# Patient Record
Sex: FEMALE | Race: WHITE | URBAN - METROPOLITAN AREA
[De-identification: names, ages, dates, MRNs, and addresses within clinical notes are randomized per-mention and may not be internally consistent; named-entity substitution may affect disease eponyms.]

---

## 2017-09-13 ENCOUNTER — TRANSFERRED RECORDS (OUTPATIENT)
Dept: HEALTH INFORMATION MANAGEMENT | Facility: CLINIC | Age: 52
End: 2017-09-13

## 2018-04-12 ENCOUNTER — OFFICE VISIT (OUTPATIENT)
Dept: SURGERY | Facility: CLINIC | Age: 53
End: 2018-04-12
Payer: COMMERCIAL

## 2018-04-12 VITALS
SYSTOLIC BLOOD PRESSURE: 100 MMHG | BODY MASS INDEX: 41.64 KG/M2 | HEIGHT: 63 IN | DIASTOLIC BLOOD PRESSURE: 60 MMHG | HEART RATE: 83 BPM | WEIGHT: 235 LBS

## 2018-04-12 DIAGNOSIS — K43.9 VENTRAL HERNIA WITHOUT OBSTRUCTION OR GANGRENE: Primary | ICD-10-CM

## 2018-04-12 PROCEDURE — 99204 OFFICE O/P NEW MOD 45 MIN: CPT | Performed by: SURGERY

## 2018-04-12 NOTE — LETTER
2018    RE: Penny Hood, : 1965             Assessment/plan: Pleasant 52-year-old female in remission from ovarian cancer surgically managed in .  Patient now has what I think is a sizable ventral hernia.  Her body habitus makes physical exam somewhat difficult and I have told her that I would try to obtain one of her previous CT scans.  It is my understanding that she had a CT scan done as recently as 2017.  If I can find this, I will have another CT scan performed.  I expect that if hernia is as big as I think it is, she would benefit from an open ventral hernia repair with component separation.  This would be done as an inpatient, likely staying 1-2 nights.  Surgical co-morbities include obesity, extensive previous abdominal surgery.           Jamie Lopez M.D.  [facility]  [phone]

## 2018-04-12 NOTE — MR AVS SNAPSHOT
"              After Visit Summary   2018    Penny Hood    MRN: [id number]           Patient Information     Date Of Birth          1965        Visit Information        Provider Department      2018 1:00 PM Toni Lopez MD [facility] General Surgery       Follow-ups after your visit        Who to contact     If you have questions or need follow up information about today's clinic visit or your schedule please contact [facility] directly at [phone].  Normal or non-critical lab and imaging results will be communicated to you by [employer], letter or phone within 4 business days after the clinic has received the results. If you do not hear from us within 7 days, please contact the clinic through [employer] or phone. If you have a critical or abnormal lab result, we will notify you by phone as soon as possible.  Submit refill requests through [employer] or call your pharmacy and they will forward the refill request to us. Please allow 3 business days for your refill to be completed.          Additional Information About Your Visit        MyChart Information     [employer] lets you send messages to your doctor, view your test results, renew your prescriptions, schedule appointments and more. To sign up, go to [email or web address] . Click on \"Log in\" on the left side of the screen, which will take you to the Welcome page. Then click on \"Sign up Now\" on the right side of the page.     You will be asked to enter the access code listed below, as well as some personal information. Please follow the directions to create your username and password.     Your access code is: [email or web address]  Expires: 2018  1:30 PM     Your access code will  in 90 days. If you need help or a new code, please call your [place] clinic or [phone].        Care EveryWhere ID     This is your Care EveryWhere ID. This could be used by other " "organizations to access your [place] medical records  [id number]        Your Vitals Were     Pulse Height BMI (Body Mass Index)             83 5' 3\" (1.6 m) 41.63 kg/m2          Blood Pressure from Last 3 Encounters:   04/12/18 100/60    Weight from Last 3 Encounters:   04/12/18 235 lb (106.6 kg)              Today, you had the following     No orders found for display       Primary Care Provider Office Phone # Fax #    Jodie Vasquez [phone]       [facility] [address] [facility] [address]  [facility] [address]        Equal Access to Services     [facility]: Hadii rafael durant hadasho Soomaali, waaxda luqadaha, qaybta kaalmada adeegyada, boom overton . So [facility] [phone].    ATENCIÓN: Si habla español, tiene a sethi disposición servicios gratuitos de asistencia lingüística. [facility] [phone].    We comply with applicable federal civil rights laws and [place] laws. We do not discriminate on the basis of race, color, national origin, age, disability, sex, sexual orientation, or gender identity.            Thank you!     Thank you for choosing [facility]  for your care. Our goal is always to provide you with excellent care. Hearing back from our patients is one way we can continue to improve our services. Please take a few minutes to complete the written survey that you may receive in the mail after your visit with us. Thank you!             Your Updated Medication List - Protect others around you: Learn how to safely use, store and throw away your medicines at www.disposemymeds.org.          This list is accurate as of 4/12/18  1:30 PM.  Always use your most recent med list.                   Brand Name Dispense Instructions for use Diagnosis    BUPROPION HCL PO      Take 450 mg by mouth daily        LETROZOLE PO             "

## 2018-04-13 PROBLEM — K43.9 VENTRAL HERNIA WITHOUT OBSTRUCTION OR GANGRENE: Status: ACTIVE | Noted: 2018-04-13

## 2018-04-13 NOTE — PROGRESS NOTES
"[facility]         Toni Lopez MD    Penny Hood MRN# [id number]   YOB: 1965 Age: 52 year old     PCP:  Jodie Vasquez [phone]    Chief Complaint: Ventral hernia    Pt was seen in consultation from Jodie Vasquez.    History of Present Illness:  Penny Hood is a 52 year old female who presented with recent progression of a ventral hernia.  Patient has a history of hysterectomy performed in 2014.  It was noted at that time that the patient had an ovarian malignancy and she subsequently underwent open staging with lymph node retrieval and omentectomy.  Did undergo chemo and has been in remission.  She was noted to have some fullness around the infraumbilical incision.  This has been slowly progressive.  She is overweight and her physical exam is somewhat challenging.  She does feel that the area of fullness has increased in size and she is here to discuss her hernia.    PMH:  Penny Hood  has a past medical history of Ovarian cancer (H).  PSH:  Penny Hood  has a past surgical history that includes Abdomen surgery (05/14/2014) and Abdomen surgery (05/20/2014).    Home medications and allergies reviewed.    Social History:  Penny Hood  reports that she quit smoking about 11 years ago. She has never used smokeless tobacco. She reports that she does not drink alcohol or use illicit drugs.  Family History:  Penny Hood family history includes Heart Failure in her father; Pancreatic Cancer in her maternal grandmother; Prostate Cancer in her father.    ROS:  The 10 point Review of Systems is negative other than noted in the HPI.  No fevers or chills.  Some difficulty with having bowel movements.    Physical Exam:  Blood pressure 100/60, pulse 83, height 5' 3\" (1.6 m), weight 235 lb (106.6 kg).  235 lbs 0 oz  Healthy overweight female in no distress.  Patient has a pleasant affect and communicates well.   Pupils " equal round and reactive to light.   No cervical lymphadenopathy or thyromegaly.   Lung fields clear, breathing comfortably.   Heart normal sinus rhythm.  No murmurs rubs or gallops.  Abdomen soft, nontender, nondistended.  Well-healed infraumbilical midline incision.  Obvious fullness extending to the left of the incision and to the right, spans a distance approximately 10-12 cm.  Fascial edges not able to be appreciated.  Minimally tender.  No overlying skin changes.  Skin warm, dry.  No obvious rashes or lesions.    All new lab and imaging data was reviewed.      Assessment/plan: Pleasant 52-year-old female in remission from ovarian cancer surgically managed in 2014.  Patient now has what I think is a sizable ventral hernia.  Her body habitus makes physical exam somewhat difficult and I have told her that I would try to obtain one of her previous CT scans.  It is my understanding that she had a CT scan done as recently as September 2017.  If I can find this, I will have another CT scan performed.  I expect that if hernia is as big as I think it is, she would benefit from an open ventral hernia repair with component separation.  This would be done as an inpatient, likely staying 1-2 nights.  Surgical co-morbities include obesity, extensive previous abdominal surgery.    Jamie Lopez M.D.  [facility]  [phone]    Please route or send letter to:  Primary Care Provider (PCP) and Referring Provider

## 2018-04-26 ENCOUNTER — OFFICE VISIT (OUTPATIENT)
Dept: SURGERY | Facility: CLINIC | Age: 53
End: 2018-04-26
Payer: COMMERCIAL

## 2018-04-26 DIAGNOSIS — K43.9 VENTRAL HERNIA WITHOUT OBSTRUCTION OR GANGRENE: Primary | ICD-10-CM

## 2018-04-26 PROCEDURE — 99213 OFFICE O/P EST LOW 20 MIN: CPT | Performed by: SURGERY

## 2018-04-26 NOTE — MR AVS SNAPSHOT
"              After Visit Summary   2018    Penny Hood    MRN: [id number]           Patient Information     Date Of Birth          1965        Visit Information        Provider Department      2018 1:15 PM Toni Lopez MD [facility] General Surgery       Follow-ups after your visit        Who to contact     If you have questions or need follow up information about today's clinic visit or your schedule please contact [facility] directly at [phone].  Normal or non-critical lab and imaging results will be communicated to you by [employer], letter or phone within 4 business days after the clinic has received the results. If you do not hear from us within 7 days, please contact the clinic through [employer] or phone. If you have a critical or abnormal lab result, we will notify you by phone as soon as possible.  Submit refill requests through [email or web address] or call your pharmacy and they will forward the refill request to us. Please allow 3 business days for your refill to be completed.          Additional Information About Your Visit        MyChart Information     [email or web address] lets you send messages to your doctor, view your test results, renew your prescriptions, schedule appointments and more. To sign up, go to [email or web address] . Click on \"Log in\" on the left side of the screen, which will take you to the Welcome page. Then click on \"Sign up Now\" on the right side of the page.     You will be asked to enter the access code listed below, as well as some personal information. Please follow the directions to create your username and password.     Your access code is: [email or web address]  Expires: 2018  1:30 PM     Your access code will  in 90 days. If you need help or a new code, please call your [place] clinic or [phone].        Care EveryWhere ID     This is your Care EveryWhere ID. This could be used by other " organizations to access your [place] medical records  [id number]         Blood Pressure from Last 3 Encounters:   04/12/18 100/60    Weight from Last 3 Encounters:   04/12/18 235 lb (106.6 kg)              Today, you had the following     No orders found for display       Primary Care Provider Office Phone # Fax #    Jodie Vasquez [phone]       [facility] [address] [facility] [address]  [facility] [address]        Equal Access to Services     CANDIDO BENAVIDEZ : Hadii aad ku hadasho Soomaali, waaxda luqadaha, qaybta kaalmada adeegyada, waxay idiin hayaan adeeg kharash la'aan . So [facility] [phone].    ATENCIÓN: Si habla español, tiene a sethi disposición servicios gratuitos de asistencia lingüística. [facility] [phone].    We comply with applicable federal civil rights laws and [place] laws. We do not discriminate on the basis of race, color, national origin, age, disability, sex, sexual orientation, or gender identity.            Thank you!     Thank you for choosing [facility]  for your care. Our goal is always to provide you with excellent care. Hearing back from our patients is one way we can continue to improve our services. Please take a few minutes to complete the written survey that you may receive in the mail after your visit with us. Thank you!             Your Updated Medication List - Protect others around you: Learn how to safely use, store and throw away your medicines at www.disposemymeds.org.          This list is accurate as of 4/26/18  1:21 PM.  Always use your most recent med list.                   Brand Name Dispense Instructions for use Diagnosis    BUPROPION HCL PO      Take 450 mg by mouth daily        LETROZOLE PO

## 2018-04-26 NOTE — LETTER
2018    Re: Penny Hood, : 1965    Surgery Note     Penny Hood presents today for further discussion of her ventral hernia.  I was able to review her most recent CT scan.  She has a large infraumbilical ventral hernia containing nonobstructed small bowel loops.  This has a greatest diameter of approximately 6-8 cm.  Think this would best be managed with an open ventral hernia repair.  This would be a retrorectus repair with mesh.  The risks of the surgery were explained to the patient, which include recurrence, wound infection, bowel injury, and mesh infection requiring removal.  She understood these risks and was interested in scheduling surgery.     Jamie Lopez M.D.  [facility]  [phone]

## 2018-04-26 NOTE — PROGRESS NOTES
Surgery Note    Penny Hood presents today for further discussion of her ventral hernia.  I was able to review her most recent CT scan.  She has a large infraumbilical ventral hernia containing nonobstructed small bowel loops.  This has a greatest diameter of approximately 6-8 cm.  Think this would best be managed with an open ventral hernia repair.  This would be a retrorectus repair with mesh.  The risks of the surgery were explained to the patient, which include recurrence, wound infection, bowel injury, and mesh infection requiring removal.  She understood these risks and was interested in scheduling surgery.    Jamie Lopez M.D.  [facility]  [phone]    Please route or send letter to:  Primary Care Provider (PCP) and Referring Provider

## 2018-04-27 ENCOUNTER — TELEPHONE (OUTPATIENT)
Dept: SURGERY | Facility: CLINIC | Age: 53
End: 2018-04-27

## 2018-04-27 NOTE — TELEPHONE ENCOUNTER
Type of surgery: Open ventral hernia repair  Location of surgery: [facility]  Date and time of surgery: 5/23/18 at 8:20am  Surgeon: Dr. Toni Lopez  Pre-Op Appt Date: Patient to schedule  Post-Op Appt Date: Patient to schedule   Packet sent out: Yes  Pre-cert/Authorization completed:  Not Applicable  Date: 4/26/18 5/7/18: Surgery rescheduled to 6/15/18 at 9:30am

## 2018-06-12 RX ORDER — MAGNESIUM CARB/ALUMINUM HYDROX 105-160MG
2 TABLET,CHEWABLE ORAL DAILY
COMMUNITY

## 2018-06-14 RX ORDER — NYSTATIN 100000 U/G
OINTMENT TOPICAL 2 TIMES DAILY PRN
COMMUNITY

## 2018-06-15 ENCOUNTER — ANESTHESIA (OUTPATIENT)
Dept: SURGERY | Facility: CLINIC | Age: 53
DRG: 355 | End: 2018-06-15
Payer: COMMERCIAL

## 2018-06-15 ENCOUNTER — APPOINTMENT (OUTPATIENT)
Dept: SURGERY | Facility: PHYSICIAN GROUP | Age: 53
End: 2018-06-15
Payer: COMMERCIAL

## 2018-06-15 ENCOUNTER — HOSPITAL ENCOUNTER (INPATIENT)
Facility: CLINIC | Age: 53
LOS: 2 days | Discharge: HOME | DRG: 355 | End: 2018-06-19
Attending: SURGERY | Admitting: SURGERY
Payer: COMMERCIAL

## 2018-06-15 ENCOUNTER — ANESTHESIA EVENT (OUTPATIENT)
Dept: SURGERY | Facility: CLINIC | Age: 53
DRG: 355 | End: 2018-06-15
Payer: COMMERCIAL

## 2018-06-15 DIAGNOSIS — K43.9 VENTRAL HERNIA WITHOUT OBSTRUCTION OR GANGRENE: Primary | ICD-10-CM

## 2018-06-15 PROCEDURE — 25000128 H RX IP 250 OP 636: Performed by: NURSE ANESTHETIST, CERTIFIED REGISTERED

## 2018-06-15 PROCEDURE — 49560 ZZHC REPAIR INCISIONAL HERNIA,REDUCIBLE: CPT | Performed by: SURGERY

## 2018-06-15 PROCEDURE — 25000128 H RX IP 250 OP 636: Performed by: ANESTHESIOLOGY

## 2018-06-15 PROCEDURE — 27210794 ZZH OR GENERAL SUPPLY STERILE: Performed by: SURGERY

## 2018-06-15 PROCEDURE — 40000170 ZZH STATISTIC PRE-PROCEDURE ASSESSMENT II: Performed by: SURGERY

## 2018-06-15 PROCEDURE — 25000132 ZZH RX MED GY IP 250 OP 250 PS 637: Performed by: SURGERY

## 2018-06-15 PROCEDURE — 25000128 H RX IP 250 OP 636: Performed by: SURGERY

## 2018-06-15 PROCEDURE — 25000566 ZZH SEVOFLURANE, EA 15 MIN: Performed by: SURGERY

## 2018-06-15 PROCEDURE — 25000128 H RX IP 250 OP 636: Performed by: PHYSICIAN ASSISTANT

## 2018-06-15 PROCEDURE — C1781 MESH (IMPLANTABLE): HCPCS | Performed by: SURGERY

## 2018-06-15 PROCEDURE — 49560 ZZHC REPAIR INCISIONAL HERNIA,REDUCIBLE: CPT | Mod: AS | Performed by: PHYSICIAN ASSISTANT

## 2018-06-15 PROCEDURE — 0WUF0JZ SUPPLEMENT ABDOMINAL WALL WITH SYNTHETIC SUBSTITUTE, OPEN APPROACH: ICD-10-PCS | Performed by: SURGERY

## 2018-06-15 PROCEDURE — 27210995 ZZH RX 272: Performed by: SURGERY

## 2018-06-15 PROCEDURE — 25000125 ZZHC RX 250: Performed by: NURSE ANESTHETIST, CERTIFIED REGISTERED

## 2018-06-15 PROCEDURE — 71000013 ZZH RECOVERY PHASE 1 LEVEL 1 EA ADDTL HR: Performed by: SURGERY

## 2018-06-15 PROCEDURE — 37000009 ZZH ANESTHESIA TECHNICAL FEE, EACH ADDTL 15 MIN: Performed by: SURGERY

## 2018-06-15 PROCEDURE — 25000125 ZZHC RX 250: Performed by: SURGERY

## 2018-06-15 PROCEDURE — 36000056 ZZH SURGERY LEVEL 3 1ST 30 MIN: Performed by: SURGERY

## 2018-06-15 PROCEDURE — 25000132 ZZH RX MED GY IP 250 OP 250 PS 637: Performed by: PHYSICIAN ASSISTANT

## 2018-06-15 PROCEDURE — 49568 ZZHC REPAIR HERNIA WITH MESH: CPT | Mod: AS | Performed by: PHYSICIAN ASSISTANT

## 2018-06-15 PROCEDURE — 71000012 ZZH RECOVERY PHASE 1 LEVEL 1 FIRST HR: Performed by: SURGERY

## 2018-06-15 PROCEDURE — 37000008 ZZH ANESTHESIA TECHNICAL FEE, 1ST 30 MIN: Performed by: SURGERY

## 2018-06-15 PROCEDURE — 49568 ZZHC REPAIR HERNIA WITH MESH: CPT | Performed by: SURGERY

## 2018-06-15 PROCEDURE — 36000058 ZZH SURGERY LEVEL 3 EA 15 ADDTL MIN: Performed by: SURGERY

## 2018-06-15 DEVICE — IMPLANTABLE DEVICE: Type: IMPLANTABLE DEVICE | Site: ABDOMEN | Status: FUNCTIONAL

## 2018-06-15 RX ORDER — NALOXONE HYDROCHLORIDE 0.4 MG/ML
.1-.4 INJECTION, SOLUTION INTRAMUSCULAR; INTRAVENOUS; SUBCUTANEOUS
Status: DISCONTINUED | OUTPATIENT
Start: 2018-06-15 | End: 2018-06-15 | Stop reason: HOSPADM

## 2018-06-15 RX ORDER — FENTANYL CITRATE 50 UG/ML
25-50 INJECTION, SOLUTION INTRAMUSCULAR; INTRAVENOUS
Status: DISCONTINUED | OUTPATIENT
Start: 2018-06-15 | End: 2018-06-15 | Stop reason: HOSPADM

## 2018-06-15 RX ORDER — CEFAZOLIN SODIUM 1 G/3ML
1 INJECTION, POWDER, FOR SOLUTION INTRAMUSCULAR; INTRAVENOUS SEE ADMIN INSTRUCTIONS
Status: DISCONTINUED | OUTPATIENT
Start: 2018-06-15 | End: 2018-06-15 | Stop reason: HOSPADM

## 2018-06-15 RX ORDER — AMOXICILLIN 250 MG
1-2 CAPSULE ORAL 2 TIMES DAILY
Status: DISCONTINUED | OUTPATIENT
Start: 2018-06-15 | End: 2018-06-19

## 2018-06-15 RX ORDER — ONDANSETRON 4 MG/1
4 TABLET, ORALLY DISINTEGRATING ORAL EVERY 6 HOURS PRN
Status: DISCONTINUED | OUTPATIENT
Start: 2018-06-15 | End: 2018-06-19 | Stop reason: HOSPADM

## 2018-06-15 RX ORDER — SODIUM CHLORIDE, SODIUM LACTATE, POTASSIUM CHLORIDE, CALCIUM CHLORIDE 600; 310; 30; 20 MG/100ML; MG/100ML; MG/100ML; MG/100ML
INJECTION, SOLUTION INTRAVENOUS CONTINUOUS PRN
Status: DISCONTINUED | OUTPATIENT
Start: 2018-06-15 | End: 2018-06-15

## 2018-06-15 RX ORDER — ONDANSETRON 2 MG/ML
4 INJECTION INTRAMUSCULAR; INTRAVENOUS EVERY 30 MIN PRN
Status: DISCONTINUED | OUTPATIENT
Start: 2018-06-15 | End: 2018-06-15 | Stop reason: HOSPADM

## 2018-06-15 RX ORDER — SODIUM CHLORIDE, SODIUM LACTATE, POTASSIUM CHLORIDE, CALCIUM CHLORIDE 600; 310; 30; 20 MG/100ML; MG/100ML; MG/100ML; MG/100ML
INJECTION, SOLUTION INTRAVENOUS CONTINUOUS
Status: DISCONTINUED | OUTPATIENT
Start: 2018-06-15 | End: 2018-06-15 | Stop reason: HOSPADM

## 2018-06-15 RX ORDER — BUPROPION HYDROCHLORIDE 150 MG/1
450 TABLET ORAL DAILY
Status: DISCONTINUED | OUTPATIENT
Start: 2018-06-15 | End: 2018-06-19 | Stop reason: HOSPADM

## 2018-06-15 RX ORDER — NEOSTIGMINE METHYLSULFATE 1 MG/ML
VIAL (ML) INJECTION PRN
Status: DISCONTINUED | OUTPATIENT
Start: 2018-06-15 | End: 2018-06-15

## 2018-06-15 RX ORDER — SODIUM CHLORIDE, SODIUM LACTATE, POTASSIUM CHLORIDE, CALCIUM CHLORIDE 600; 310; 30; 20 MG/100ML; MG/100ML; MG/100ML; MG/100ML
INJECTION, SOLUTION INTRAVENOUS CONTINUOUS
Status: DISCONTINUED | OUTPATIENT
Start: 2018-06-15 | End: 2018-06-18

## 2018-06-15 RX ORDER — ONDANSETRON 2 MG/ML
4 INJECTION INTRAMUSCULAR; INTRAVENOUS EVERY 6 HOURS PRN
Status: DISCONTINUED | OUTPATIENT
Start: 2018-06-15 | End: 2018-06-19 | Stop reason: HOSPADM

## 2018-06-15 RX ORDER — HYDROMORPHONE HYDROCHLORIDE 1 MG/ML
.3-.5 INJECTION, SOLUTION INTRAMUSCULAR; INTRAVENOUS; SUBCUTANEOUS EVERY 10 MIN PRN
Status: DISCONTINUED | OUTPATIENT
Start: 2018-06-15 | End: 2018-06-15 | Stop reason: HOSPADM

## 2018-06-15 RX ORDER — NALOXONE HYDROCHLORIDE 0.4 MG/ML
.1-.4 INJECTION, SOLUTION INTRAMUSCULAR; INTRAVENOUS; SUBCUTANEOUS
Status: DISCONTINUED | OUTPATIENT
Start: 2018-06-15 | End: 2018-06-19 | Stop reason: HOSPADM

## 2018-06-15 RX ORDER — PHYSOSTIGMINE SALICYLATE 1 MG/ML
1.2 INJECTION INTRAVENOUS
Status: DISCONTINUED | OUTPATIENT
Start: 2018-06-15 | End: 2018-06-15 | Stop reason: HOSPADM

## 2018-06-15 RX ORDER — ONDANSETRON 4 MG/1
4 TABLET, ORALLY DISINTEGRATING ORAL EVERY 30 MIN PRN
Status: DISCONTINUED | OUTPATIENT
Start: 2018-06-15 | End: 2018-06-15 | Stop reason: HOSPADM

## 2018-06-15 RX ORDER — HYDROMORPHONE HYDROCHLORIDE 1 MG/ML
.3-.5 INJECTION, SOLUTION INTRAMUSCULAR; INTRAVENOUS; SUBCUTANEOUS
Status: DISCONTINUED | OUTPATIENT
Start: 2018-06-15 | End: 2018-06-19 | Stop reason: HOSPADM

## 2018-06-15 RX ORDER — OXYCODONE HYDROCHLORIDE 5 MG/1
5-10 TABLET ORAL
Status: DISCONTINUED | OUTPATIENT
Start: 2018-06-15 | End: 2018-06-19 | Stop reason: HOSPADM

## 2018-06-15 RX ORDER — LIDOCAINE 40 MG/G
CREAM TOPICAL
Status: DISCONTINUED | OUTPATIENT
Start: 2018-06-15 | End: 2018-06-19 | Stop reason: HOSPADM

## 2018-06-15 RX ORDER — DIPHENHYDRAMINE HYDROCHLORIDE 50 MG/ML
25 INJECTION INTRAMUSCULAR; INTRAVENOUS EVERY 6 HOURS PRN
Status: DISCONTINUED | OUTPATIENT
Start: 2018-06-15 | End: 2018-06-19 | Stop reason: HOSPADM

## 2018-06-15 RX ORDER — BUPIVACAINE HYDROCHLORIDE 5 MG/ML
INJECTION, SOLUTION EPIDURAL; INTRACAUDAL
Status: DISCONTINUED
Start: 2018-06-15 | End: 2018-06-15 | Stop reason: HOSPADM

## 2018-06-15 RX ORDER — DIPHENHYDRAMINE HCL 25 MG
25 CAPSULE ORAL EVERY 6 HOURS PRN
Status: DISCONTINUED | OUTPATIENT
Start: 2018-06-15 | End: 2018-06-19 | Stop reason: HOSPADM

## 2018-06-15 RX ORDER — FENTANYL CITRATE 50 UG/ML
25-50 INJECTION, SOLUTION INTRAMUSCULAR; INTRAVENOUS EVERY 5 MIN PRN
Status: DISCONTINUED | OUTPATIENT
Start: 2018-06-15 | End: 2018-06-15 | Stop reason: HOSPADM

## 2018-06-15 RX ORDER — GLYCOPYRROLATE 0.2 MG/ML
INJECTION, SOLUTION INTRAMUSCULAR; INTRAVENOUS PRN
Status: DISCONTINUED | OUTPATIENT
Start: 2018-06-15 | End: 2018-06-15

## 2018-06-15 RX ORDER — MAGNESIUM HYDROXIDE 1200 MG/15ML
LIQUID ORAL PRN
Status: DISCONTINUED | OUTPATIENT
Start: 2018-06-15 | End: 2018-06-15 | Stop reason: HOSPADM

## 2018-06-15 RX ORDER — PROCHLORPERAZINE MALEATE 10 MG
10 TABLET ORAL EVERY 6 HOURS PRN
Status: DISCONTINUED | OUTPATIENT
Start: 2018-06-15 | End: 2018-06-19 | Stop reason: HOSPADM

## 2018-06-15 RX ORDER — FENTANYL CITRATE 50 UG/ML
INJECTION, SOLUTION INTRAMUSCULAR; INTRAVENOUS PRN
Status: DISCONTINUED | OUTPATIENT
Start: 2018-06-15 | End: 2018-06-15

## 2018-06-15 RX ORDER — MULTIVITAMIN,THERAPEUTIC
1 TABLET ORAL DAILY
COMMUNITY

## 2018-06-15 RX ORDER — PROPOFOL 10 MG/ML
INJECTION, EMULSION INTRAVENOUS CONTINUOUS PRN
Status: DISCONTINUED | OUTPATIENT
Start: 2018-06-15 | End: 2018-06-15

## 2018-06-15 RX ORDER — CEFAZOLIN SODIUM 2 G/100ML
2 INJECTION, SOLUTION INTRAVENOUS
Status: COMPLETED | OUTPATIENT
Start: 2018-06-15 | End: 2018-06-15

## 2018-06-15 RX ORDER — LETROZOLE 2.5 MG/1
2.5 TABLET, FILM COATED ORAL DAILY
Status: DISCONTINUED | OUTPATIENT
Start: 2018-06-15 | End: 2018-06-19 | Stop reason: HOSPADM

## 2018-06-15 RX ORDER — PROPOFOL 10 MG/ML
INJECTION, EMULSION INTRAVENOUS PRN
Status: DISCONTINUED | OUTPATIENT
Start: 2018-06-15 | End: 2018-06-15

## 2018-06-15 RX ORDER — KETOROLAC TROMETHAMINE 15 MG/ML
15 INJECTION, SOLUTION INTRAMUSCULAR; INTRAVENOUS EVERY 6 HOURS
Status: COMPLETED | OUTPATIENT
Start: 2018-06-15 | End: 2018-06-16

## 2018-06-15 RX ORDER — DEXAMETHASONE SODIUM PHOSPHATE 4 MG/ML
INJECTION, SOLUTION INTRA-ARTICULAR; INTRALESIONAL; INTRAMUSCULAR; INTRAVENOUS; SOFT TISSUE PRN
Status: DISCONTINUED | OUTPATIENT
Start: 2018-06-15 | End: 2018-06-15

## 2018-06-15 RX ORDER — LIDOCAINE HYDROCHLORIDE 20 MG/ML
INJECTION, SOLUTION INFILTRATION; PERINEURAL PRN
Status: DISCONTINUED | OUTPATIENT
Start: 2018-06-15 | End: 2018-06-15

## 2018-06-15 RX ORDER — MEPERIDINE HYDROCHLORIDE 25 MG/ML
12.5 INJECTION INTRAMUSCULAR; INTRAVENOUS; SUBCUTANEOUS
Status: DISCONTINUED | OUTPATIENT
Start: 2018-06-15 | End: 2018-06-15 | Stop reason: HOSPADM

## 2018-06-15 RX ORDER — ACETAMINOPHEN 325 MG/1
650 TABLET ORAL EVERY 6 HOURS PRN
Status: DISCONTINUED | OUTPATIENT
Start: 2018-06-15 | End: 2018-06-17

## 2018-06-15 RX ORDER — BUPIVACAINE HYDROCHLORIDE 5 MG/ML
INJECTION, SOLUTION PERINEURAL PRN
Status: DISCONTINUED | OUTPATIENT
Start: 2018-06-15 | End: 2018-06-15 | Stop reason: HOSPADM

## 2018-06-15 RX ORDER — EPHEDRINE SULFATE 50 MG/ML
INJECTION, SOLUTION INTRAMUSCULAR; INTRAVENOUS; SUBCUTANEOUS PRN
Status: DISCONTINUED | OUTPATIENT
Start: 2018-06-15 | End: 2018-06-15

## 2018-06-15 RX ADMIN — FENTANYL CITRATE 50 MCG: 50 INJECTION, SOLUTION INTRAMUSCULAR; INTRAVENOUS at 09:46

## 2018-06-15 RX ADMIN — Medication 1 ML: at 21:27

## 2018-06-15 RX ADMIN — SODIUM CHLORIDE, POTASSIUM CHLORIDE, SODIUM LACTATE AND CALCIUM CHLORIDE: 600; 310; 30; 20 INJECTION, SOLUTION INTRAVENOUS at 23:40

## 2018-06-15 RX ADMIN — FENTANYL CITRATE 50 MCG: 50 INJECTION, SOLUTION INTRAMUSCULAR; INTRAVENOUS at 10:56

## 2018-06-15 RX ADMIN — HYDROMORPHONE HYDROCHLORIDE 0.5 MG: 1 INJECTION, SOLUTION INTRAMUSCULAR; INTRAVENOUS; SUBCUTANEOUS at 11:51

## 2018-06-15 RX ADMIN — HYDROMORPHONE HYDROCHLORIDE 0.5 MG: 1 INJECTION, SOLUTION INTRAMUSCULAR; INTRAVENOUS; SUBCUTANEOUS at 22:43

## 2018-06-15 RX ADMIN — SODIUM CHLORIDE, POTASSIUM CHLORIDE, SODIUM LACTATE AND CALCIUM CHLORIDE: 600; 310; 30; 20 INJECTION, SOLUTION INTRAVENOUS at 15:01

## 2018-06-15 RX ADMIN — DEXMEDETOMIDINE HYDROCHLORIDE 4 MCG: 100 INJECTION, SOLUTION INTRAVENOUS at 09:39

## 2018-06-15 RX ADMIN — KETOROLAC TROMETHAMINE 15 MG: 15 INJECTION, SOLUTION INTRAMUSCULAR; INTRAVENOUS at 16:45

## 2018-06-15 RX ADMIN — FENTANYL CITRATE 50 MCG: 50 INJECTION, SOLUTION INTRAMUSCULAR; INTRAVENOUS at 09:19

## 2018-06-15 RX ADMIN — KETOROLAC TROMETHAMINE 15 MG: 15 INJECTION, SOLUTION INTRAMUSCULAR; INTRAVENOUS at 11:42

## 2018-06-15 RX ADMIN — DEXAMETHASONE SODIUM PHOSPHATE 4 MG: 4 INJECTION, SOLUTION INTRA-ARTICULAR; INTRALESIONAL; INTRAMUSCULAR; INTRAVENOUS; SOFT TISSUE at 10:53

## 2018-06-15 RX ADMIN — GLYCOPYRROLATE 0.6 MG: 0.2 INJECTION, SOLUTION INTRAMUSCULAR; INTRAVENOUS at 11:07

## 2018-06-15 RX ADMIN — SODIUM CHLORIDE, POTASSIUM CHLORIDE, SODIUM LACTATE AND CALCIUM CHLORIDE: 600; 310; 30; 20 INJECTION, SOLUTION INTRAVENOUS at 10:52

## 2018-06-15 RX ADMIN — HYDROMORPHONE HYDROCHLORIDE 0.5 MG: 1 INJECTION, SOLUTION INTRAMUSCULAR; INTRAVENOUS; SUBCUTANEOUS at 11:32

## 2018-06-15 RX ADMIN — OXYCODONE HYDROCHLORIDE 5 MG: 5 TABLET ORAL at 17:55

## 2018-06-15 RX ADMIN — NEOSTIGMINE METHYLSULFATE 3 MG: 1 INJECTION, SOLUTION INTRAVENOUS at 11:07

## 2018-06-15 RX ADMIN — SENNOSIDES AND DOCUSATE SODIUM 1 TABLET: 8.6; 5 TABLET ORAL at 21:21

## 2018-06-15 RX ADMIN — Medication 5 MG: at 09:27

## 2018-06-15 RX ADMIN — PROPOFOL 200 MCG/KG/MIN: 10 INJECTION, EMULSION INTRAVENOUS at 09:19

## 2018-06-15 RX ADMIN — SODIUM CHLORIDE, POTASSIUM CHLORIDE, SODIUM LACTATE AND CALCIUM CHLORIDE: 600; 310; 30; 20 INJECTION, SOLUTION INTRAVENOUS at 09:18

## 2018-06-15 RX ADMIN — ENOXAPARIN SODIUM 40 MG: 40 INJECTION SUBCUTANEOUS at 15:45

## 2018-06-15 RX ADMIN — PROPOFOL 200 MG: 10 INJECTION, EMULSION INTRAVENOUS at 09:19

## 2018-06-15 RX ADMIN — HYDROMORPHONE HYDROCHLORIDE 0.5 MG: 1 INJECTION, SOLUTION INTRAMUSCULAR; INTRAVENOUS; SUBCUTANEOUS at 12:18

## 2018-06-15 RX ADMIN — ONDANSETRON 4 MG: 2 INJECTION INTRAMUSCULAR; INTRAVENOUS at 10:58

## 2018-06-15 RX ADMIN — HYDROMORPHONE HYDROCHLORIDE 0.5 MG: 1 INJECTION, SOLUTION INTRAMUSCULAR; INTRAVENOUS; SUBCUTANEOUS at 13:54

## 2018-06-15 RX ADMIN — SODIUM CHLORIDE, POTASSIUM CHLORIDE, SODIUM LACTATE AND CALCIUM CHLORIDE: 600; 310; 30; 20 INJECTION, SOLUTION INTRAVENOUS at 15:57

## 2018-06-15 RX ADMIN — KETOROLAC TROMETHAMINE 15 MG: 15 INJECTION, SOLUTION INTRAMUSCULAR; INTRAVENOUS at 23:36

## 2018-06-15 RX ADMIN — MIDAZOLAM 2 MG: 1 INJECTION INTRAMUSCULAR; INTRAVENOUS at 09:19

## 2018-06-15 RX ADMIN — FENTANYL CITRATE 50 MCG: 50 INJECTION, SOLUTION INTRAMUSCULAR; INTRAVENOUS at 10:13

## 2018-06-15 RX ADMIN — ROCURONIUM BROMIDE 10 MG: 10 INJECTION INTRAVENOUS at 09:37

## 2018-06-15 RX ADMIN — Medication 1 ML: at 19:30

## 2018-06-15 RX ADMIN — OXYCODONE HYDROCHLORIDE 5 MG: 5 TABLET ORAL at 19:30

## 2018-06-15 RX ADMIN — BUPROPION HYDROCHLORIDE 450 MG: 150 TABLET, FILM COATED, EXTENDED RELEASE ORAL at 15:41

## 2018-06-15 RX ADMIN — HYDROMORPHONE HYDROCHLORIDE 0.3 MG: 1 INJECTION, SOLUTION INTRAMUSCULAR; INTRAVENOUS; SUBCUTANEOUS at 21:21

## 2018-06-15 RX ADMIN — Medication 5 MG: at 09:49

## 2018-06-15 RX ADMIN — DEXMEDETOMIDINE HYDROCHLORIDE 4 MCG: 100 INJECTION, SOLUTION INTRAVENOUS at 11:05

## 2018-06-15 RX ADMIN — DEXMEDETOMIDINE HYDROCHLORIDE 4 MCG: 100 INJECTION, SOLUTION INTRAVENOUS at 09:41

## 2018-06-15 RX ADMIN — ROCURONIUM BROMIDE 30 MG: 10 INJECTION INTRAVENOUS at 09:23

## 2018-06-15 RX ADMIN — Medication 5 MG: at 09:30

## 2018-06-15 RX ADMIN — DEXMEDETOMIDINE HYDROCHLORIDE 4 MCG: 100 INJECTION, SOLUTION INTRAVENOUS at 09:35

## 2018-06-15 RX ADMIN — FENTANYL CITRATE 50 MCG: 50 INJECTION, SOLUTION INTRAMUSCULAR; INTRAVENOUS at 11:09

## 2018-06-15 RX ADMIN — CEFAZOLIN SODIUM 2 G: 2 INJECTION, SOLUTION INTRAVENOUS at 09:20

## 2018-06-15 RX ADMIN — DEXMEDETOMIDINE HYDROCHLORIDE 4 MCG: 100 INJECTION, SOLUTION INTRAVENOUS at 09:37

## 2018-06-15 RX ADMIN — LETROZOLE 2.5 MG: 2.5 TABLET, FILM COATED ORAL at 16:45

## 2018-06-15 ASSESSMENT — LIFESTYLE VARIABLES: TOBACCO_USE: 1

## 2018-06-15 NOTE — PROGRESS NOTES
Pt arrived from PACU at 1430. Sats 93 on 5LNC. All other VSS. Abdominal pain at 3 out of 10. No nausea. Tolerating small sips of juice. Abdominal binder in place. Incision dressing CDI. GABI patent with 45cc of dark red output. Capno on. PCD's on.  at bedside. Bed alarm on.

## 2018-06-15 NOTE — BRIEF OP NOTE
[facility] Brief Operative Note    Pre-operative diagnosis: VENTRAL HERNIA   Post-operative diagnosis Ventral hernia   Procedure: Procedure(s):  OPEN VENTRAL HERNIA REPAIR WITH MESH - Wound Class: I-Clean   Surgeon(s): Surgeon(s) and Role:     * Toni Lopez MD - Primary     * Soumya Voss PA-C - Assisting   Estimated blood loss: 15 mL    Specimens: * No specimens in log *   Findings: As above. 20x15 skirted symbotex composite mesh used. 15-round GABI drain placed. No immediate complications. See operative report for full details.      Soumya Voss PA-C  [facility]  [phone]

## 2018-06-15 NOTE — IP AVS SNAPSHOT
MRN:[id number]                      After Visit Summary   6/15/2018    Penny Hood    MRN: [id number]           Thank you!     Thank you for choosing [facility] for your care. Our goal is always to provide you with excellent care. Hearing back from our patients is one way we can continue to improve our services. Please take a few minutes to complete the written survey that you may receive in the mail after you visit with us. Thank you!        Patient Information     Date Of Birth          1965        Designated Caregiver       Most Recent Value    Caregiver    Will someone help with your care after discharge? yes    Name of designated caregiver Bill - spouse    Phone number of caregiver [phone]    Caregiver address [address] [place] [address]  [address]      About your hospital stay     You were admitted on:  Dinora 15, 2018 You last received care in the:  [address]    You were discharged on:  June 19, 2018        Reason for your hospital stay       Repair of ventral hernia with Dr. Lopez                  Who to Call     For medical emergencies, please call 911.  For non-urgent questions about your medical care, please call your primary care provider or clinic, [phone]  For questions related to your surgery, please call your surgery clinic        Attending Provider     Provider Toni Myers MD Surgery       Primary Care Provider Office Phone # Fax #    Jodie Vasquez [phone]      Your next 10 appointments already scheduled     Jun 28, 2018 12:30 PM CDT   Post Op with Toni Lopez MD   [facility] [place] [facility] [place]    [address]  [place] [id number] [phone]              Further instructions from your care team       [facility] [place] [facility]  Discharge Instructions: Post-Operative Open Ventral Hernia Repair    ACTIVITY    Take  frequent, short walks and increase your activity gradually.      Avoid strenuous physical activity or heavy lifting greater than 15 lbs. for 3-4 weeks.  You may climb stairs.    You may drive without restrictions when you are not using any prescription pain medication and feel comfortable in a car.    You may return to work/school when you are comfortable without any prescription pain medication.    WOUND CARE    You may remove your bandage and shower 48 hours after the surgery.  Pat your incisions dry and leave it open to air.  Re-apply dressing (Band-Aids or gauze/tape) as needed for comfort or drainage.    You may have steri-strips (looks like white tape) on your incisions.  You may peel off the steri-strips 2 weeks after your surgery if they have not peeled off on their own.     Do not soak your incisions in a tub or pool for 2 weeks.     Do not apply any lotions, creams, or ointments to your incisions.    A ridge under your incisions is normal and will gradually resolve.    DIET    Start with liquids, then gradually resume your regular diet as tolerated.     Drink plenty of fluids to stay hydrated.    PAIN    Expect some tenderness and discomfort at the incision site(s).  Use the prescribed pain medication at your discretion.  Expect gradual resolution of your pain over several days.    You may take ibuprofen with food (unless you have been told not to) instead of or in addition to your prescribed pain medication.  If you are taking Norco or Percocet, do not take any additional acetaminophen/APAP/Tylenol.    Do not drink alcohol or drive while you are taking pain medications.    You may apply ice to your incisions in 20 minute intervals as needed for the next 48 hours.  After that time, consider switching to heat if you prefer.    EXPECTATIONS    Pain medications can cause constipation.  Limit use when possible.  Take over the counter stool softener/stimulant, such as Colace or Senna, 1-2 times a day with  "plenty of water.  You may take a mild over the counter laxative, such as Miralax or a suppository, as needed.  You may take 1 oz. (2 tablespoons) Milk of Magnesia following surgery to encourage bowel movement.  You may discontinue these medications once you are having regular bowel movements and/or are no longer taking your narcotic pain medication    FOLLOW UP  Please follow-up in 2 weeks in Dr. Lopez's office for your first postoperative appointment. Call [phone] to schedule. Our clinic's name [facility]. The address is [address] [place] [address] [address], [facility] [address], [place], [place], [address]          CALL OUR OFFICE [phone] IF YOU HAVE:     Chills or fever above 101 F.    Increased redness, warmth, or drainage at your incisions.    Significant bleeding.    Pain not relieved by your pain medication or rest.    Increasing pain after the first 48 hours.    Any other concerns or questions.    Revised January 2018     Pending Results     Date and Time Order Name Status Description    6/19/2018 1645 EKG 12-lead, tracing only Preliminary             Statement of Approval     Ordered          06/19/18 0721  I have reviewed and agree with all the recommendations and orders detailed in this document.  EFFECTIVE NOW     Approved and electronically signed by:  Toni Lopez MD             Admission Information     Date & Time Provider Department Dept. Phone    6/15/2018 Toni Lopez MD [place] [address] Oncology [phone]      Your Vitals Were     Blood Pressure Pulse Temperature Respirations Height Weight    107/67 (BP Location: Right arm) 87 96.7  F (35.9  C) (Oral) 16 1.6 m (5' 3\") 107.1 kg (236 lb 3.2 oz)    Pulse Oximetry BMI (Body Mass Index)                94% 41.84 kg/m2          MyChart Information     [employer] lets you send messages to your doctor, view your test results, renew your prescriptions, schedule appointments and more. To sign up, go to [email or web address] . Click on \"Log in\" " "on the left side of the screen, which will take you to the Welcome page. Then click on \"Sign up Now\" on the right side of the page.     You will be asked to enter the access code listed below, as well as some personal information. Please follow the directions to create your username and password.     Your access code is: [email or web address]  Expires: 2018  1:30 PM     Your access code will  in 90 days. If you need help or a new code, please call your [facility] clinic or [phone].        Care EveryWhere ID     This is your Care EveryWhere ID. This could be used by other organizations to access your [facility] medical records  [id number]        Equal Access to Services     CANDIDO BENAVIDEZ : Mata Maddox, ramona stearns, juliana hickey, boom overton . So [facility] [phone].    ATENCIÓN: Si habla español, tiene a sethi disposición servicios gratuitos de asistencia lingüística. Llame al [phone].    We comply with applicable federal civil rights laws and [place] laws. We do not discriminate on the basis of race, color, national origin, age, disability, sex, sexual orientation, or gender identity.               Review of your medicines      START taking        Dose / Directions    acetaminophen 325 MG tablet   Commonly known as:  TYLENOL   Used for:  Ventral hernia without obstruction or gangrene        Dose:  650 mg   Take 2 tablets (650 mg) by mouth every 6 hours   Quantity:  100 tablet   Refills:  0       ondansetron 4 MG ODT tab   Commonly known as:  ZOFRAN-ODT   Used for:  Ventral hernia without obstruction or gangrene        Dose:  4 mg   Take 1 tablet (4 mg) by mouth every 6 hours as needed for nausea or vomiting   Quantity:  120 tablet   Refills:  0       oxyCODONE IR 5 MG tablet   Commonly known as:  ROXICODONE   Used for:  Ventral hernia without obstruction or gangrene        Dose:  5-10 mg   Take 1-2 tablets (5-10 mg) by mouth every 3 hours as needed " for other (pain control or improvement in physical function. Hold dose for analgesic side effects.)   Quantity:  30 tablet   Refills:  0       senna-docusate 8.6-50 MG per tablet   Commonly known as:  SENOKOT-S;PERICOLACE   Used for:  Ventral hernia without obstruction or gangrene        Dose:  1-2 tablet   Take 1-2 tablets by mouth 2 times daily as needed for constipation   Quantity:  60 tablet   Refills:  0         CONTINUE these medicines which may have CHANGED, or have new prescriptions. If we are uncertain of the size of tablets/capsules you have at home, strength may be listed as something that might have changed.        Dose / Directions    * IBUPROFEN PO   This may have changed:  Another medication with the same name was added. Make sure you understand how and when to take each.        Dose:  400-600 mg   Take 400-600 mg by mouth 2 times daily as needed for moderate pain   Refills:  0       * ibuprofen 600 MG tablet   Commonly known as:  ADVIL/MOTRIN   This may have changed:  You were already taking a medication with the same name, and this prescription was added. Make sure you understand how and when to take each.   Used for:  Ventral hernia without obstruction or gangrene        Dose:  600 mg   Take 1 tablet (600 mg) by mouth 3 times daily (with meals)   Quantity:  120 tablet   Refills:  0       * Notice:  This list has 2 medication(s) that are the same as other medications prescribed for you. Read the directions carefully, and ask your doctor or other care provider to review them with you.      CONTINUE these medicines which have NOT CHANGED        Dose / Directions    glucosamine-chondroitinoitin 750-600 MG Tabs        Dose:  2 tablet   Take 2 tablets by mouth daily   Refills:  0       HAIR SKIN AND NAILS FORMULA PO        Dose:  2 tablet   Take 2 tablets by mouth daily   Refills:  0       LETROZOLE PO        Dose:  2.5 mg   Take 2.5 mg by mouth daily   Refills:  0       multivitamin, therapeutic Tabs  tablet        Dose:  1 tablet   Take 1 tablet by mouth daily   Refills:  0       nystatin ointment   Commonly known as:  MYCOSTATIN        Apply topically 2 times daily as needed   Refills:  0       WELLBUTRIN XL PO        Dose:  450 mg   Take 450 mg by mouth daily (1 x 150 mg + 1 x 300 mg = 450 mg dose)   Refills:  0            Where to get your medicines      These medications were sent to [facility] [place], [place] - [address]  [address], [place] [address]     Phone:  [phone]     ondansetron 4 MG ODT tab    senna-docusate 8.6-50 MG per tablet         Some of these will need a paper prescription and others can be bought over the counter. Ask your nurse if you have questions.     Bring a paper prescription for each of these medications     oxyCODONE IR 5 MG tablet       You don't need a prescription for these medications     acetaminophen 325 MG tablet    ibuprofen 600 MG tablet                Protect others around you: Learn how to safely use, store and throw away your medicines at www.disposemymeds.org.        Information about OPIOIDS     PRESCRIPTION OPIOIDS: WHAT YOU NEED TO KNOW   We gave you an opioid (narcotic) pain medicine. It is important to manage your pain, but opioids are not always the best choice. You should first try all the other options your care team gave you. Take this medicine for as short a time (and as few doses) as possible.     These medicines have risks:    DO NOT drive when on new or higher doses of pain medicine. These medicines can affect your alertness and reaction times, and you could be arrested for driving under the influence (DUI). If you need to use opioids long-term, talk to your care team about driving.    DO NOT operate heave machinery    DO NOT do any other dangerous activities while taking these medicines.     DO NOT drink any alcohol while taking these medicines.      If the opioid prescribed includes acetaminophen, DO NOT take with  any other medicines that contain acetaminophen. Read all labels carefully. Look for the word  acetaminophen  or  Tylenol.  Ask your pharmacist if you have questions or are unsure.    You can get addicted to pain medicines, especially if you have a history of addiction (chemical, alcohol or substance dependence). Talk to your care team about ways to reduce this risk.    Store your pills in a secure place, locked if possible. We will not replace any lost or stolen medicine. If you don t finish your medicine, please throw away (dispose) as directed by your pharmacist. The [place] Pollution [employer] has more information about safe disposal: https://www.pca.[facility].us/living-green/managing-unwanted-medications.     All opioids tend to cause constipation. Drink plenty of water and eat foods that have a lot of fiber, such as fruits, vegetables, prune juice, apple juice and high-fiber cereal. Take a laxative (Miralax, milk of magnesia, Colace, Senna) if you don t move your bowels at least every other day.              Medication List: This is a list of all your medications and when to take them. Check marks below indicate your daily home schedule. Keep this list as a reference.      Medications           Morning Afternoon Evening Bedtime As Needed    acetaminophen 325 MG tablet   Commonly known as:  TYLENOL   Take 2 tablets (650 mg) by mouth every 6 hours   Last time this was given:  650 mg on 6/19/2018  2:16 PM                                   glucosamine-chondroitinoitin 750-600 MG Tabs   Take 2 tablets by mouth daily                                   HAIR SKIN AND NAILS FORMULA PO   Take 2 tablets by mouth daily                                   * IBUPROFEN PO   Take 400-600 mg by mouth 2 times daily as needed for moderate pain   Last time this was given:  600 mg on 6/19/2018  6:27 PM                                   * ibuprofen 600 MG tablet   Commonly known as:  ADVIL/MOTRIN   Take 1 tablet (600 mg) by mouth  3 times daily (with meals)   Last time this was given:  600 mg on 6/19/2018  6:27 PM                                         LETROZOLE PO   Take 2.5 mg by mouth daily   Last time this was given:  2.5 mg on 6/19/2018  8:02 AM                                   multivitamin, therapeutic Tabs tablet   Take 1 tablet by mouth daily                                   nystatin ointment   Commonly known as:  MYCOSTATIN   Apply topically 2 times daily as needed                                   ondansetron 4 MG ODT tab   Commonly known as:  ZOFRAN-ODT   Take 1 tablet (4 mg) by mouth every 6 hours as needed for nausea or vomiting                                   oxyCODONE IR 5 MG tablet   Commonly known as:  ROXICODONE   Take 1-2 tablets (5-10 mg) by mouth every 3 hours as needed for other (pain control or improvement in physical function. Hold dose for analgesic side effects.)   Last time this was given:  5 mg on 6/18/2018 12:11 PM                                   senna-docusate 8.6-50 MG per tablet   Commonly known as:  SENOKOT-S;PERICOLACE   Take 1-2 tablets by mouth 2 times daily as needed for constipation   Last time this was given:  2 tablets on 6/18/2018  5:29 PM                                   WELLBUTRIN XL PO   Take 450 mg by mouth daily (1 x 150 mg + 1 x 300 mg = 450 mg dose)   Last time this was given:  450 mg on 6/19/2018  8:02 AM                                   * Notice:  This list has 2 medication(s) that are the same as other medications prescribed for you. Read the directions carefully, and ask your doctor or other care provider to review them with you.

## 2018-06-15 NOTE — ANESTHESIA PREPROCEDURE EVALUATION
Anesthesia Evaluation     . Pt has had prior anesthetic. Type: General           ROS/MED HX    ENT/Pulmonary:     (+)tobacco use, Past use , . .    Neurologic:       Cardiovascular:         METS/Exercise Tolerance:     Hematologic:         Musculoskeletal:         GI/Hepatic:         Renal/Genitourinary:         Endo:     (+) Obesity, .      Psychiatric:     (+) psychiatric history depression      Infectious Disease:         Malignancy:   (+) Malignancy History of Other  Other CA ovarian cancer status post         Other:                                    Anesthesia Plan      History & Physical Review  History and physical reviewed and following examination; no interval change.    ASA Status:  3 .        Plan for General with Intravenous induction. Maintenance will be TIVA.    PONV prophylaxis:  Ondansetron (or other 5HT-3) and Dexamethasone or Solumedrol  Propofol, Zofran, and decadron      Postoperative Care  Postoperative pain management:  IV analgesics and Oral pain medications.      Consents  Anesthetic plan, risks, benefits and alternatives discussed with:  Patient..                          .

## 2018-06-15 NOTE — ANESTHESIA POSTPROCEDURE EVALUATION
Patient: Penny Hood    Procedure(s):  OPEN VENTRAL HERNIA REPAIR WITH MESH - Wound Class: I-Clean    Diagnosis:VENTRAL HERNIA  Diagnosis Additional Information: No value filed.    Anesthesia Type:  General    Note:  Anesthesia Post Evaluation    Patient location during evaluation: PACU  Patient participation: Able to fully participate in evaluation  Level of consciousness: awake  Pain management: adequate  Airway patency: patent  Cardiovascular status: acceptable  Respiratory status: acceptable  Hydration status: acceptable  PONV: controlled     Anesthetic complications: None          Last vitals:  Vitals:    06/15/18 1145 06/15/18 1200 06/15/18 1206   BP: 121/82 111/79    Pulse:      Resp: 15 16 12   Temp:      SpO2: 100% 97% 94%         Electronically Signed By: Chicho Centeno MD  Dinora 15, 2018  12:09 PM

## 2018-06-15 NOTE — ANESTHESIA CARE TRANSFER NOTE
Patient: Penny Hood    Procedure(s):  OPEN VENTRAL HERNIA REPAIR WITH MESH - Wound Class: I-Clean    Diagnosis: VENTRAL HERNIA  Diagnosis Additional Information: No value filed.    Anesthesia Type:   General     Note:  Airway :Face Mask  Patient transferred to:PACU  Comments: Neuromuscular blockade reversed after TOF 4/4, spontaneous respirations, adequate tidal volumes, followed commands to voice, oropharynx suctioned with soft flexible catheter, extubated atraumatically, extubated with suction, airway patent after extubation.  Oxygen via facemask at 10 liters per minute to PACU. Oxygen tubing connected to wall O2 in PACU, SpO2, NiBP, and EKG monitors and alarms on and functioning, Althea Hugger warmer connected to patient gown, report on patient's clinical status given to PACU RN, RN questions answered. Handoff Report: Identifed the Patient, Identified the Reponsible Provider, Reviewed the pertinent medical history, Discussed the surgical course, Reviewed Intra-OP anesthesia mangement and issues during anesthesia, Set expectations for post-procedure period and Allowed opportunity for questions and acknowledgement of understanding      Vitals: (Last set prior to Anesthesia Care Transfer)    CRNA VITALS  6/15/2018 1049 - 6/15/2018 1127      6/15/2018             Resp Rate (observed): 16    EKG: Sinus rhythm                Electronically Signed By: SHYLA Hernandez CRNA  Dinora 15, 2018  11:27 AM

## 2018-06-15 NOTE — LETTER
[facility]  [facility]  [address]  [place] [address]  [phone]          June 18, 2018    RE:  Penny Hood                                                                                                                                                       [address] [place] [address]            To whom it may concern:    Penny Hood is under my professional care for her recent surgery. Please excuse her from all work-related activities for 3 weeks. She  may return to work on Monday, July 9th with the following restrictions: No heavy lifting >15 pounds or strenuous physical activity until Monday, August 13th. At that time she should be able to work without restrictions.        BRISA Jerez MD  [facility]  [phone]

## 2018-06-15 NOTE — OP NOTE
General Surgery Operative Note    PREOPERATIVE DIAGNOSIS:  VENTRAL HERNIA    POSTOPERATIVE DIAGNOSIS:  Ventral hernia    PROCEDURE: Open ventral hernia    ANESTHESIA:  General.    PREOPERATIVE MEDICATIONS: Ancef    SURGEON:  Toni Lopez MD    ASSISTANT:  Soumya Voss PA-C.  Assistant was required owing to challenging exposure and need for retraction.    INDICATIONS: Patient underwent open total abdominal hysterectomy bilateral salpingo-oophorectomy many years ago.  She began having bulging along her lower midline incision and CT scan confirmed a large midline ventral hernia.  She now presents for open ventral hernia repair with mesh.  Risks and benefits were explained.    PROCEDURE:  The patient was taken to the operating suite and uneventfully endotracheally intubated.  The abdomen was prepped and draped in a sterile fashion.  Surgeon initiated timeout was acknowledged.  Ioban was placed over the patient's abdominal wall.  We made a lower midline incision through the patient's previous scar and took this down through skin and subcutaneous tissue.  We eventually encountered a large hernia sac and dissected around this circumferentially until we got to the fascial borders of the patient's hernia.  We entered the hernia sac and saw several loops of small bowel which were able to be easily manipulated.  There were several other smaller hernias cephalad and these were connected to the larger hernia defect.  I trimmed away some of the hernia sac and began creating a retro-rectus space.  This was done with cautery to a distance of approximately 6 cm laterally and at the apices.  Bleeding was controlled with cautery and ties.  Once we had an excellent space the posterior rectus sheath and peritoneum were closed along the midline with a pair of running 0 Vicryl sutures.  We then placed a 15 x 20 cm piece of Symbotex mesh in the retrorectus space.  This had excellent overlap circumferentially.  This was secured with  several interrupted 0 Vicryl sutures through the fascia.  Once the mesh was secured in place the wound was irrigated.  We closed the anterior rectus sheath with very minimal tension with a pair of looped 0 Maxon sutures.  Mesh was completely covered.  Again the wound was irrigated and a drain was placed over the anterior rectus sheath.  We closed the subcutaneous tissue with several layers of interrupted suture.  The skin edges were reapproximated with 4-0 Vicryl and Steri-Strips.  The patient was uneventfully extubated, awakened and taken to the PACU in stable condition.  At the conclusion of the case, all lap and needle counts were correct.      ESTIMATED BLOOD LOSS: 20 mL    INTRAOPERATIVE FINDINGS: 10 x 14 cm lower midline ventral hernia, closed in a retrorectus fashion with a 15 x 20 cm piece of Symbotex mesh.    Toni Lopez MD

## 2018-06-15 NOTE — IP AVS SNAPSHOT
[address], [address]    [facility] [address]    Phone:  [phone]                                       After Visit Summary   6/15/2018    Penny Hood    MRN: [id number]           After Visit Summary Signature Page     I have received my discharge instructions, and my questions have been answered. I have discussed any challenges I see with this plan with the nurse or doctor.    ..........................................................................................................................................  Patient/Patient Representative Signature      ..........................................................................................................................................  Patient Representative Print Name and Relationship to Patient    ..................................................               ................................................  Date                                            Time    ..........................................................................................................................................  Reviewed by Signature/Title    ...................................................              ..............................................  Date                                                            Time

## 2018-06-15 NOTE — PROGRESS NOTES
Admission medication history interview status for the 6/15/2018  admission is complete. See EPIC admission navigator for prior to admission medications     Medication history source reliability:Good    Medication history interview source(s):Patient    Medication history resources (including written lists, pill bottles, clinic record):None    Primary pharmacy.[facility]    Additional medication history information not noted on PTA med list :None    Time spent in this activity: 45 minutes    Prior to Admission medications    Medication Sig Last Dose Taking? Auth Provider   BuPROPion HCl (WELLBUTRIN XL PO) Take 450 mg by mouth daily (1 x 150 mg + 1 x 300 mg = 450 mg dose) 6/14/2018 at am Yes Reported, Patient   glucosamine-chondroitinoitin 750-600 MG TABS Take 2 tablets by mouth daily 6/13/2018 Yes Reported, Patient   IBUPROFEN PO Take 400-600 mg by mouth 2 times daily as needed for moderate pain  6/13/2018 Yes Reported, Patient   LETROZOLE PO Take 2.5 mg by mouth daily  6/14/2018 at am Yes Reported, Patient   Multiple Vitamins-Minerals (HAIR SKIN AND NAILS FORMULA PO) Take 2 tablets by mouth daily 6/14/2018 at am Yes Reported, Patient   multivitamin, therapeutic (THERA-VIT) TABS tablet Take 1 tablet by mouth daily 6/13/2018 Yes Reported, Patient   nystatin (MYCOSTATIN) ointment Apply topically 2 times daily as needed  more than a week at prn Yes Reported, Patient

## 2018-06-16 LAB
ANION GAP SERPL CALCULATED.3IONS-SCNC: 7 MMOL/L (ref 3–14)
BUN SERPL-MCNC: 13 MG/DL (ref 7–30)
CALCIUM SERPL-MCNC: 8.4 MG/DL (ref 8.5–10.1)
CHLORIDE SERPL-SCNC: 104 MMOL/L (ref 94–109)
CO2 SERPL-SCNC: 27 MMOL/L (ref 20–32)
CREAT SERPL-MCNC: 0.85 MG/DL (ref 0.52–1.04)
GFR SERPL CREATININE-BSD FRML MDRD: 71 ML/MIN/1.7M2
GLUCOSE SERPL-MCNC: 127 MG/DL (ref 70–99)
HGB BLD-MCNC: 11.2 G/DL (ref 11.7–15.7)
PLATELET # BLD AUTO: 242 10E9/L (ref 150–450)
POTASSIUM SERPL-SCNC: 4.1 MMOL/L (ref 3.4–5.3)
SODIUM SERPL-SCNC: 138 MMOL/L (ref 133–144)

## 2018-06-16 PROCEDURE — 25000132 ZZH RX MED GY IP 250 OP 250 PS 637: Performed by: SURGERY

## 2018-06-16 PROCEDURE — 25000132 ZZH RX MED GY IP 250 OP 250 PS 637: Performed by: PHYSICIAN ASSISTANT

## 2018-06-16 PROCEDURE — 80048 BASIC METABOLIC PNL TOTAL CA: CPT | Performed by: PHYSICIAN ASSISTANT

## 2018-06-16 PROCEDURE — 85018 HEMOGLOBIN: CPT | Performed by: PHYSICIAN ASSISTANT

## 2018-06-16 PROCEDURE — 36415 COLL VENOUS BLD VENIPUNCTURE: CPT | Performed by: PHYSICIAN ASSISTANT

## 2018-06-16 PROCEDURE — 25000128 H RX IP 250 OP 636: Performed by: PHYSICIAN ASSISTANT

## 2018-06-16 PROCEDURE — 85049 AUTOMATED PLATELET COUNT: CPT | Performed by: PHYSICIAN ASSISTANT

## 2018-06-16 RX ORDER — CALCIUM CARBONATE 500 MG/1
1000 TABLET, CHEWABLE ORAL
Status: DISCONTINUED | OUTPATIENT
Start: 2018-06-16 | End: 2018-06-19 | Stop reason: HOSPADM

## 2018-06-16 RX ORDER — POLYETHYLENE GLYCOL 3350 17 G/17G
17 POWDER, FOR SOLUTION ORAL DAILY
Status: DISCONTINUED | OUTPATIENT
Start: 2018-06-16 | End: 2018-06-19

## 2018-06-16 RX ADMIN — BUPROPION HYDROCHLORIDE 450 MG: 150 TABLET, FILM COATED, EXTENDED RELEASE ORAL at 08:18

## 2018-06-16 RX ADMIN — SENNOSIDES AND DOCUSATE SODIUM 1 TABLET: 8.6; 5 TABLET ORAL at 08:18

## 2018-06-16 RX ADMIN — HYDROMORPHONE HYDROCHLORIDE 0.5 MG: 1 INJECTION, SOLUTION INTRAMUSCULAR; INTRAVENOUS; SUBCUTANEOUS at 10:07

## 2018-06-16 RX ADMIN — LETROZOLE 2.5 MG: 2.5 TABLET, FILM COATED ORAL at 08:18

## 2018-06-16 RX ADMIN — SENNOSIDES AND DOCUSATE SODIUM 2 TABLET: 8.6; 5 TABLET ORAL at 21:05

## 2018-06-16 RX ADMIN — CALCIUM CARBONATE (ANTACID) CHEW TAB 500 MG 1000 MG: 500 CHEW TAB at 04:33

## 2018-06-16 RX ADMIN — ENOXAPARIN SODIUM 40 MG: 40 INJECTION SUBCUTANEOUS at 14:09

## 2018-06-16 RX ADMIN — OXYCODONE HYDROCHLORIDE 10 MG: 5 TABLET ORAL at 18:34

## 2018-06-16 RX ADMIN — SODIUM CHLORIDE, POTASSIUM CHLORIDE, SODIUM LACTATE AND CALCIUM CHLORIDE: 600; 310; 30; 20 INJECTION, SOLUTION INTRAVENOUS at 08:17

## 2018-06-16 RX ADMIN — HYDROMORPHONE HYDROCHLORIDE 0.5 MG: 1 INJECTION, SOLUTION INTRAMUSCULAR; INTRAVENOUS; SUBCUTANEOUS at 16:45

## 2018-06-16 RX ADMIN — ACETAMINOPHEN 650 MG: 325 TABLET, FILM COATED ORAL at 19:24

## 2018-06-16 RX ADMIN — POLYETHYLENE GLYCOL 3350 17 G: 17 POWDER, FOR SOLUTION ORAL at 18:29

## 2018-06-16 RX ADMIN — KETOROLAC TROMETHAMINE 15 MG: 15 INJECTION, SOLUTION INTRAMUSCULAR; INTRAVENOUS at 05:46

## 2018-06-16 RX ADMIN — OXYCODONE HYDROCHLORIDE 10 MG: 5 TABLET ORAL at 14:39

## 2018-06-16 RX ADMIN — OXYCODONE HYDROCHLORIDE 10 MG: 5 TABLET ORAL at 22:42

## 2018-06-16 RX ADMIN — OXYCODONE HYDROCHLORIDE 10 MG: 5 TABLET ORAL at 03:12

## 2018-06-16 RX ADMIN — OXYCODONE HYDROCHLORIDE 10 MG: 5 TABLET ORAL at 08:25

## 2018-06-16 NOTE — PLAN OF CARE
Problem: Patient Care Overview  Goal: Plan of Care/Patient Progress Review  Outcome: No Change  Pt is A&O x4. Up with 1. Tolerating clear liquid diet. L PIV infusing LR @ 125. BS hypoactive, (-) flatus. Midline incision covered, dressing CDI. Abdominal binder in place. Increased pain when ambulating, up with 1. C/o 5/10 abdominal pain gave Oxycodone (5mg) x2 and Dilaudid x2 with relief. On scheduled Toradol. VSS on Cap[place] weaned from 5L to 3L. DC date pending.

## 2018-06-16 NOTE — PLAN OF CARE
Problem: Patient Care Overview  Goal: Plan of Care/Patient Progress Review  POD1. A/ox4. VSS on 3L O2 via NC unable to wean more, encouraged IS use every hour. C/o pain gave prn oxycodone and diluadid X1-effective . Midline incision covered, dressing CDI, abd binder intact. R GABI drain dressing CDI. BS faint, no flatus. A1.

## 2018-06-16 NOTE — PLAN OF CARE
MD Notification    Notified Person: MD    Notified Person Name: Ayde    Notification Date/Time: 6/16/18 7736    Notification Interaction: Telephone    Purpose of Notification: Pt has hypoactive BS (-) BM or flatus. looking for Miralax order     Orders Received: OK to enter Miralax order    Comments:

## 2018-06-16 NOTE — PROGRESS NOTES
Surgery Postoperative Note    Doing well. Pain controled with oxy and occasional dilaudid. Feels thirsty and is trying clears now. No bowel function. No fevers or tachycardia.    General- Alert and Oriented.  GI: Abdomen Soft. Mild tenderness to palpation incision. GABI serosang    A/P-   Pain controlled. Cont current pain regime. Slowly ADAT. Ambulate/IS.    Yvon Bunch M.D.  [facility]  [phone]

## 2018-06-16 NOTE — PLAN OF CARE
Problem: Patient Care Overview  Goal: Plan of Care/Patient Progress Review  Outcome: No Change  A/ox4. VSS on 4L O2 via NC. C/o of abdominal pain-gave prn oxycodone 10mg x1 with decrease in pain. Gave chewable tums for heartburn x1. LS clear. BS faint, -flatus. Midline incision with dressing in place-c/d/i. GABI-dressing c/d/i. Voiding clear yellow output via bathroom. Ambulates with assist of 1. IVF-infusing. Continue to monitor.

## 2018-06-17 LAB
GLUCOSE BLDC GLUCOMTR-MCNC: 100 MG/DL (ref 70–99)
GLUCOSE BLDC GLUCOMTR-MCNC: 118 MG/DL (ref 70–99)

## 2018-06-17 PROCEDURE — 25000132 ZZH RX MED GY IP 250 OP 250 PS 637: Performed by: SURGERY

## 2018-06-17 PROCEDURE — 12000000 ZZH R&B MED SURG/OB

## 2018-06-17 PROCEDURE — 25000132 ZZH RX MED GY IP 250 OP 250 PS 637: Performed by: PHYSICIAN ASSISTANT

## 2018-06-17 PROCEDURE — 00000146 ZZHCL STATISTIC GLUCOSE BY METER IP

## 2018-06-17 PROCEDURE — 25000128 H RX IP 250 OP 636: Performed by: PHYSICIAN ASSISTANT

## 2018-06-17 RX ORDER — ACETAMINOPHEN 325 MG/1
650 TABLET ORAL
Status: DISCONTINUED | OUTPATIENT
Start: 2018-06-17 | End: 2018-06-19 | Stop reason: HOSPADM

## 2018-06-17 RX ORDER — BISACODYL 10 MG
10 SUPPOSITORY, RECTAL RECTAL DAILY PRN
Status: DISCONTINUED | OUTPATIENT
Start: 2018-06-17 | End: 2018-06-18

## 2018-06-17 RX ORDER — AMOXICILLIN 250 MG
1-2 CAPSULE ORAL 2 TIMES DAILY PRN
Qty: 60 TABLET | Refills: 0 | Status: SHIPPED | OUTPATIENT
Start: 2018-06-17

## 2018-06-17 RX ADMIN — BUPROPION HYDROCHLORIDE 450 MG: 150 TABLET, FILM COATED, EXTENDED RELEASE ORAL at 08:54

## 2018-06-17 RX ADMIN — SENNOSIDES AND DOCUSATE SODIUM 2 TABLET: 8.6; 5 TABLET ORAL at 21:37

## 2018-06-17 RX ADMIN — ENOXAPARIN SODIUM 40 MG: 40 INJECTION SUBCUTANEOUS at 13:30

## 2018-06-17 RX ADMIN — OXYCODONE HYDROCHLORIDE 10 MG: 5 TABLET ORAL at 12:57

## 2018-06-17 RX ADMIN — ACETAMINOPHEN 650 MG: 325 TABLET, FILM COATED ORAL at 13:29

## 2018-06-17 RX ADMIN — POLYETHYLENE GLYCOL 3350 17 G: 17 POWDER, FOR SOLUTION ORAL at 08:54

## 2018-06-17 RX ADMIN — OXYCODONE HYDROCHLORIDE 10 MG: 5 TABLET ORAL at 21:37

## 2018-06-17 RX ADMIN — LETROZOLE 2.5 MG: 2.5 TABLET, FILM COATED ORAL at 08:54

## 2018-06-17 RX ADMIN — SENNOSIDES AND DOCUSATE SODIUM 2 TABLET: 8.6; 5 TABLET ORAL at 08:54

## 2018-06-17 RX ADMIN — ACETAMINOPHEN 650 MG: 325 TABLET, FILM COATED ORAL at 19:04

## 2018-06-17 RX ADMIN — OXYCODONE HYDROCHLORIDE 10 MG: 5 TABLET ORAL at 02:08

## 2018-06-17 RX ADMIN — ONDANSETRON 4 MG: 2 INJECTION INTRAMUSCULAR; INTRAVENOUS at 19:10

## 2018-06-17 RX ADMIN — OXYCODONE HYDROCHLORIDE 10 MG: 5 TABLET ORAL at 17:00

## 2018-06-17 RX ADMIN — OXYCODONE HYDROCHLORIDE 10 MG: 5 TABLET ORAL at 05:42

## 2018-06-17 RX ADMIN — CALCIUM CARBONATE (ANTACID) CHEW TAB 500 MG 1000 MG: 500 CHEW TAB at 11:46

## 2018-06-17 RX ADMIN — ACETAMINOPHEN 650 MG: 325 TABLET, FILM COATED ORAL at 01:00

## 2018-06-17 NOTE — PLAN OF CARE
Problem: Patient Care Overview  Goal: Plan of Care/Patient Progress Review  Outcome: No Change  POD 2. A/o. VSS on 3L NC. C/o of abdominal pain. Gave prn oxycodone x2 and prn tylenol x1. Ls clear. BS hypoactive, -flatus. Trying to encourage pt to ambulate more. Voiding adequately via bathroom. Ambulates with assist of 1. Abdominal incision c/d/i. GABI dressing c/d/i-putting out red serosanguineous output. IV-SL (encouraging pt to drink adequate fluids). POD 2 . Did brief walk from bedside to nurses station. Needs encouragement with ambulation. Continue to monitor.

## 2018-06-17 NOTE — PROGRESS NOTES
Pt s/p large ventral hernia repair, expected to remain in hospital for at least 3 midnights. Has not advanced diet yet, needs to work on ambulation and bowel function prior to discharge. Will admit to inpatient.    Soumya Voss PA-C  [facility]  [phone]

## 2018-06-17 NOTE — DISCHARGE INSTRUCTIONS
[facility]  Discharge Instructions: Post-Operative Open Ventral Hernia Repair    ACTIVITY    Take frequent, short walks and increase your activity gradually.      Avoid strenuous physical activity or heavy lifting greater than 15 lbs. for 3-4 weeks.  You may climb stairs.    You may drive without restrictions when you are not using any prescription pain medication and feel comfortable in a car.    You may return to work/school when you are comfortable without any prescription pain medication.    WOUND CARE    You may remove your bandage and shower 48 hours after the surgery.  Pat your incisions dry and leave it open to air.  Re-apply dressing (Band-Aids or gauze/tape) as needed for comfort or drainage.    You may have steri-strips (looks like white tape) on your incisions.  You may peel off the steri-strips 2 weeks after your surgery if they have not peeled off on their own.     Do not soak your incisions in a tub or pool for 2 weeks.     Do not apply any lotions, creams, or ointments to your incisions.    A ridge under your incisions is normal and will gradually resolve.    DIET    Start with liquids, then gradually resume your regular diet as tolerated.     Drink plenty of fluids to stay hydrated.    PAIN    Expect some tenderness and discomfort at the incision site(s).  Use the prescribed pain medication at your discretion.  Expect gradual resolution of your pain over several days.    You may take ibuprofen with food (unless you have been told not to) instead of or in addition to your prescribed pain medication.  If you are taking Norco or Percocet, do not take any additional acetaminophen/APAP/Tylenol.    Do not drink alcohol or drive while you are taking pain medications.    You may apply ice to your incisions in 20 minute intervals as needed for the next 48 hours.  After that time, consider switching to heat if you prefer.    EXPECTATIONS    Pain medications can cause  constipation.  Limit use when possible.  Take over the counter stool softener/stimulant, such as Colace or Senna, 1-2 times a day with plenty of water.  You may take a mild over the counter laxative, such as Miralax or a suppository, as needed.  You may take 1 oz. (2 tablespoons) Milk of Magnesia following surgery to encourage bowel movement.  You may discontinue these medications once you are having regular bowel movements and/or are no longer taking your narcotic pain medication    FOLLOW UP  Please follow-up in 2 weeks in Dr. Lopez's office for your first postoperative appointment. Call [phone] to schedule. Our clinic's name is [facility]. The address is [facility] [address], [facility] [address], [place], [address]          CALL OUR OFFICE -[phone] IF YOU HAVE:     Chills or fever above 101 F.    Increased redness, warmth, or drainage at your incisions.    Significant bleeding.    Pain not relieved by your pain medication or rest.    Increasing pain after the first 48 hours.    Any other concerns or questions.    Revised January 2018

## 2018-06-17 NOTE — PLAN OF CARE
Problem: Patient Care Overview  Goal: Plan of Care/Patient Progress Review  POD 2. A&Ox4, VSS on 2L NC continue to wean as able. C/o of abdominal pain gave prn oxycodone along with scheduled tylenol- effective. Midline incision covered, dressing CDI, abd binder in place. R-ena drain CDI putting out red serosanguineous output. BS hypoactive, -flatus. Continuing to encourage more fluids and ambulation. Ambulates with assist of 1.ambulated in the halls X2 and up in chair. Low fiber diet- doesn't really have an appetite currently. L PIV SL.

## 2018-06-17 NOTE — PROGRESS NOTES
"General Surgery Progress Note    Admission Date: 6/15/2018  Today's Date: 6/17/2018         Assessment:      Penny Hood is a 53 year old female   S/P open ventral incisional hernia repair with mesh  POD #2     Diagnosis: large ventral hernia         Plan:   - Encourage ambulation, walk in halls at least 4 times daily  - Incentive spirometer 10x per hour, encourage deep breathing and coughing. Wean O2 as tolerated  - Continue GABI drain, likely remove prior to discharge depending on outupt  - Minimize IV narcotics. Will change tylenol to scheduled, oxy available. Has completed toradol doses  - Bowel regimen with scheduled senna-docusate and miralax. Dulcolax suppository prn  - Full liquid diet now, OK to try low fiber today if remains free of nausea  - IVFs: OK to keep saline locked if PO intake remains adequate  - DVT prophylaxis: PCDs, lovenox    Dispo: slowly improving. Hopeful discharge to home in 1-2 days        Interval History:   Afebrile, VSS, O2 requirement slowly decreasing - currently requiring 3L via NC. Patient denies nausea, but reports no appetite. Taking in some clear and full liquids. Hardly walked yesterday, needs a lot of encouragement to get out of bed. Using IS at times, admits to forgetting about it. Abdominal pain 5/10, states that pain medication is helpful but prefers the IV dilaudid. No flatus or BM yet, not interested in trying suppository right now. GABI with 95cc out x 24 hours. Good UOP.          Physical Exam:   /54 (BP Location: Left arm)  Pulse 92  Temp 99.1  F (37.3  C) (Oral)  Resp 14  Ht 1.6 m (5' 3\")  Wt 107.1 kg (236 lb 3.2 oz)  SpO2 94%  BMI 41.84 kg/m2  I/O last 3 completed shifts:  In: 540 [P.O.:540]  Out: 1295 [Urine:1200; Drains:95]  General: NAD, pleasant, alert and oriented x3  Cardiovascular: regular rate and rhythm; S1 and S2 distinct without murmur  Respiratory: slightly decreased breath sounds but no wheezes, rales or rhonchi appreciated  Abdomen: " soft, appropriately tender, nondistended. GABI drain with scant SS fluid in bulb  Incision: clean, dry, and intact with dressing in place  Extremities: no calf tenderness, no LE edema    LABS:  Recent Labs   Lab Test  06/16/18   0618   HGB  11.2*   PLT  242      Recent Labs   Lab Test  06/16/18   0618   POTASSIUM  4.1   CHLORIDE  104   CO2  27   BUN  13   CR  0.85   ANIONGAP  7                 Soumya Voss PA-C  [facility]: [phone]  Pager: [phone] (7am-4pm)

## 2018-06-17 NOTE — PLAN OF CARE
Problem: Patient Care Overview  Goal: Plan of Care/Patient Progress Review  Outcome: No Change   Pt A&Ox4, up with 1. VSS - weaned to 2L NC. L PIV SL'd. Ambulated in room x2. C/o abdominal pain- gave oxy x2, tylenol x1, Dilaudid x1 with relief. Tolerating full liquid diet. VSS on 3L NC. Midline incision dressing CDI, ice applied. Abdominal binder in place. R GABI drain with moderate amount dark red output. BS faint, (-) flatus or BM. Started Miralax this evening, continuing with scheduled Senna. DC date pending.

## 2018-06-18 ENCOUNTER — APPOINTMENT (OUTPATIENT)
Dept: CT IMAGING | Facility: CLINIC | Age: 53
DRG: 355 | End: 2018-06-18
Attending: PHYSICIAN ASSISTANT
Payer: COMMERCIAL

## 2018-06-18 ENCOUNTER — APPOINTMENT (OUTPATIENT)
Dept: GENERAL RADIOLOGY | Facility: CLINIC | Age: 53
DRG: 355 | End: 2018-06-18
Attending: PHYSICIAN ASSISTANT
Payer: COMMERCIAL

## 2018-06-18 LAB — D DIMER PPP FEU-MCNC: 1.1 UG/ML FEU (ref 0–0.5)

## 2018-06-18 PROCEDURE — 25000128 H RX IP 250 OP 636: Performed by: PHYSICIAN ASSISTANT

## 2018-06-18 PROCEDURE — 25000132 ZZH RX MED GY IP 250 OP 250 PS 637: Performed by: SURGERY

## 2018-06-18 PROCEDURE — 71260 CT THORAX DX C+: CPT

## 2018-06-18 PROCEDURE — 12000000 ZZH R&B MED SURG/OB

## 2018-06-18 PROCEDURE — 71046 X-RAY EXAM CHEST 2 VIEWS: CPT

## 2018-06-18 PROCEDURE — 40000275 ZZH STATISTIC RCP TIME EA 10 MIN

## 2018-06-18 PROCEDURE — 94640 AIRWAY INHALATION TREATMENT: CPT

## 2018-06-18 PROCEDURE — 94640 AIRWAY INHALATION TREATMENT: CPT | Mod: 76

## 2018-06-18 PROCEDURE — 25000132 ZZH RX MED GY IP 250 OP 250 PS 637: Performed by: PHYSICIAN ASSISTANT

## 2018-06-18 PROCEDURE — 25000128 H RX IP 250 OP 636: Performed by: SURGERY

## 2018-06-18 PROCEDURE — 85379 FIBRIN DEGRADATION QUANT: CPT | Performed by: PHYSICIAN ASSISTANT

## 2018-06-18 PROCEDURE — 25000125 ZZHC RX 250: Performed by: SURGERY

## 2018-06-18 PROCEDURE — 25000125 ZZHC RX 250: Performed by: PHYSICIAN ASSISTANT

## 2018-06-18 PROCEDURE — 36415 COLL VENOUS BLD VENIPUNCTURE: CPT | Performed by: PHYSICIAN ASSISTANT

## 2018-06-18 RX ORDER — ONDANSETRON 4 MG/1
4 TABLET, ORALLY DISINTEGRATING ORAL EVERY 6 HOURS PRN
Qty: 120 TABLET | Refills: 0 | Status: SHIPPED | OUTPATIENT
Start: 2018-06-18

## 2018-06-18 RX ORDER — ALBUTEROL SULFATE 0.83 MG/ML
2.5 SOLUTION RESPIRATORY (INHALATION) 2 TIMES DAILY
Status: DISCONTINUED | OUTPATIENT
Start: 2018-06-18 | End: 2018-06-19 | Stop reason: HOSPADM

## 2018-06-18 RX ORDER — ALBUTEROL SULFATE 0.83 MG/ML
2.5 SOLUTION RESPIRATORY (INHALATION) EVERY 6 HOURS PRN
Status: DISCONTINUED | OUTPATIENT
Start: 2018-06-18 | End: 2018-06-19 | Stop reason: HOSPADM

## 2018-06-18 RX ORDER — IOPAMIDOL 755 MG/ML
79 INJECTION, SOLUTION INTRAVASCULAR ONCE
Status: COMPLETED | OUTPATIENT
Start: 2018-06-18 | End: 2018-06-18

## 2018-06-18 RX ORDER — IBUPROFEN 600 MG/1
600 TABLET, FILM COATED ORAL
Qty: 120 TABLET | Refills: 0 | COMMUNITY
Start: 2018-06-18

## 2018-06-18 RX ORDER — BISACODYL 10 MG
10 SUPPOSITORY, RECTAL RECTAL DAILY PRN
Status: DISCONTINUED | OUTPATIENT
Start: 2018-06-19 | End: 2018-06-19 | Stop reason: HOSPADM

## 2018-06-18 RX ORDER — BISACODYL 10 MG
10 SUPPOSITORY, RECTAL RECTAL ONCE
Status: COMPLETED | OUTPATIENT
Start: 2018-06-18 | End: 2018-06-18

## 2018-06-18 RX ORDER — ACETAMINOPHEN 325 MG/1
650 TABLET ORAL EVERY 6 HOURS
Qty: 100 TABLET | Refills: 0 | COMMUNITY
Start: 2018-06-18

## 2018-06-18 RX ORDER — IBUPROFEN 600 MG/1
600 TABLET, FILM COATED ORAL
Status: DISCONTINUED | OUTPATIENT
Start: 2018-06-18 | End: 2018-06-19 | Stop reason: HOSPADM

## 2018-06-18 RX ORDER — OXYCODONE HYDROCHLORIDE 5 MG/1
5-10 TABLET ORAL
Qty: 30 TABLET | Refills: 0 | Status: SHIPPED | OUTPATIENT
Start: 2018-06-18 | End: 2018-06-26

## 2018-06-18 RX ADMIN — BUPROPION HYDROCHLORIDE 450 MG: 150 TABLET, FILM COATED, EXTENDED RELEASE ORAL at 10:04

## 2018-06-18 RX ADMIN — PROCHLORPERAZINE EDISYLATE 10 MG: 5 INJECTION INTRAMUSCULAR; INTRAVENOUS at 17:32

## 2018-06-18 RX ADMIN — FAMOTIDINE 20 MG: 10 INJECTION, SOLUTION INTRAVENOUS at 22:17

## 2018-06-18 RX ADMIN — IBUPROFEN 600 MG: 600 TABLET ORAL at 17:30

## 2018-06-18 RX ADMIN — ACETAMINOPHEN 650 MG: 325 TABLET, FILM COATED ORAL at 19:38

## 2018-06-18 RX ADMIN — SENNOSIDES AND DOCUSATE SODIUM 2 TABLET: 8.6; 5 TABLET ORAL at 17:29

## 2018-06-18 RX ADMIN — ACETAMINOPHEN 650 MG: 325 TABLET, FILM COATED ORAL at 08:21

## 2018-06-18 RX ADMIN — IBUPROFEN 600 MG: 600 TABLET ORAL at 12:11

## 2018-06-18 RX ADMIN — IOPAMIDOL 79 ML: 755 INJECTION, SOLUTION INTRAVENOUS at 13:51

## 2018-06-18 RX ADMIN — LETROZOLE 2.5 MG: 2.5 TABLET, FILM COATED ORAL at 08:21

## 2018-06-18 RX ADMIN — SODIUM CHLORIDE 101 ML: 9 INJECTION, SOLUTION INTRAVENOUS at 13:52

## 2018-06-18 RX ADMIN — OXYCODONE HYDROCHLORIDE 5 MG: 5 TABLET ORAL at 04:14

## 2018-06-18 RX ADMIN — ALBUTEROL SULFATE 2.5 MG: 2.5 SOLUTION RESPIRATORY (INHALATION) at 19:58

## 2018-06-18 RX ADMIN — OXYCODONE HYDROCHLORIDE 5 MG: 5 TABLET ORAL at 08:21

## 2018-06-18 RX ADMIN — BISACODYL 10 MG: 10 SUPPOSITORY RECTAL at 12:12

## 2018-06-18 RX ADMIN — ENOXAPARIN SODIUM 40 MG: 40 INJECTION SUBCUTANEOUS at 13:21

## 2018-06-18 RX ADMIN — ONDANSETRON 4 MG: 2 INJECTION INTRAMUSCULAR; INTRAVENOUS at 13:23

## 2018-06-18 RX ADMIN — POLYETHYLENE GLYCOL 3350 17 G: 17 POWDER, FOR SOLUTION ORAL at 08:21

## 2018-06-18 RX ADMIN — FAMOTIDINE 20 MG: 10 INJECTION, SOLUTION INTRAVENOUS at 10:04

## 2018-06-18 RX ADMIN — OXYCODONE HYDROCHLORIDE 5 MG: 5 TABLET ORAL at 12:11

## 2018-06-18 RX ADMIN — ACETAMINOPHEN 650 MG: 325 TABLET, FILM COATED ORAL at 13:22

## 2018-06-18 RX ADMIN — OXYCODONE HYDROCHLORIDE 5 MG: 5 TABLET ORAL at 00:42

## 2018-06-18 RX ADMIN — SENNOSIDES AND DOCUSATE SODIUM 2 TABLET: 8.6; 5 TABLET ORAL at 08:21

## 2018-06-18 NOTE — PLAN OF CARE
Problem: Pain, Acute (Adult)  Goal: Identify Related Risk Factors and Signs and Symptoms  Related risk factors and signs and symptoms are identified upon initiation of Human Response Clinical Practice Guideline (CPG).   Outcome: No Change  Pt ambulating with minimal SBA in halls.  Eating little, drinking ok.  Dulcolax suppository given (had not been passing flatus).  Oxygen levels down as far as 78% on room air, provider notified.  Satting in 90s on 2L NC.  Pt had chest x-ray and D-dimer.  Encouraging IS.  New SL placed today.  Ibuprofen added for pain, using PRN oxycodone also.  GABI remains in place.  Abd dressing in place C/D/I.  CT chest r/o PE.

## 2018-06-18 NOTE — PLAN OF CARE
Problem: Patient Care Overview  Goal: Plan of Care/Patient Progress Review  Outcome: No Change  POD 3.  Pt A&Ox4.  VSS on 1.5L oxygen via NC.  Pain managed with PRN oxycodone.  CMS intact.  Up with SBA to BR.  Voiding adequately.  R-GABI drain patent w/serosanguineous output.  L PIV SL.  Low fiber diet.  No complaints of nausea during the night.  BS hypoactive, -flatus.  Abdominal binder in place.  Discharge pending.  Nursing to continue to monitor.

## 2018-06-18 NOTE — PROGRESS NOTES
"General Surgery Progress Note    Admission Date: 6/15/2018  Today's Date: 6/18/2018         Assessment:      Penny Hood is a 53 year old female   S/P open ventral incisional hernia repair with mesh  POD #3  Diagnosis: large ventral hernia         Plan:   - Dulcolax suppository this morning. Continue senna-docusate and miralax  - Out of bed more, ambulate in halls at least qid  - Monitor O2, encourage IS  - Scheduled tylenol and ibuprofen, oxy available prn. Work on decreasing narcotics  - PCDs, lovenox. Will add IV pepcid while on lovenox and PO ibuprofen  - Drain output decreasing, will discuss with Dr. Lopez if OK to remove prior to discharge    - Continue low fiber diet  - IV saline locked    Dispo: stable, slowly improving. Likely discharge to home later today vs tomorrow morning pending some return of bowel function        Interval History:   Afebrile overnight, O2 now weaned to room air. Slightly tachycardic at times. No chest pain, shortness of breath, dizziness. Free of nausea overnight, tolerating low fiber diet but appetite is low. Patient admits she is hesitant to eat because she had postoperative vomiting after a previous surgery. Denies passing flatus, refused suppository yesterday. Needs a lot of encouragement to get out of bed, was up walking 3 times yesterday with assistance. Voiding adequately. Drain with 55cc out x 24 hours. Using IS.          Physical Exam:   /61 (BP Location: Left arm)  Pulse 91  Temp 99.7  F (37.6  C) (Oral)  Resp 16  Ht 1.6 m (5' 3\")  Wt 107.1 kg (236 lb 3.2 oz)  SpO2 93%  BMI 41.84 kg/m2  I/O last 3 completed shifts:  In: 370 [P.O.:370]  Out: 1405 [Urine:1350; Drains:55]  General: NAD, awake and alert  Cardiovascular: regular rate and rhythm; S1 and S2 distinct without murmur   Respiratory: lungs clear to auscultation bilaterally without wheezes, rales or rhonchi   Abdomen: soft, obese; patient reports tenderness to even very light touch of skin especially " epigastric region. Not significantly distended, hypoactive bowel sounds. GABI with SS fluid in bulb  Incision: clean, dry, and intact with new dressing in place  Extremities: no edema, no calf tenderness    LABS:  No new labs  Recent Labs   Lab Test  06/16/18   0618   HGB  11.2*   PLT  242      Recent Labs   Lab Test  06/16/18   0618   POTASSIUM  4.1   CHLORIDE  104   CO2  27   BUN  13   CR  0.85   ANIONGAP  7                 Soumya Voss PA-C  [facility]: [phone]  Pager: [phone] (7am-4pm)

## 2018-06-18 NOTE — PROGRESS NOTES
General Surgery - Progress Note    CT negative for PE. Patchy infiltrates, images reviewed by Dr. Lopez and likely more consistent with atelectasis than focal pneumonia. Will check CBC tomorrow, add albuterol neb. Pt does have smoking history. Aggressive pulmonary toilet with IS 10x per hour and acapella.      Soumya Voss PA-C  [facility]  [phone]

## 2018-06-18 NOTE — PROGRESS NOTES
Progress Note - General Surgery    Pt with hypoxia in upper 70s, unable to remain on room air. Still denies shortness of breath or chest pain. Occasional tachycardia. D-dimer elevated, likely postop state but does not help rule out PE. CXR negative for acute process.  Discussed with Dr. Lopez. Will order CT PE to rule out pulmonary embolism. Lovenox SC changed to bid dosing per pharmacy recommendation.    Soumya Voss PA-C  [facility]  [phone]

## 2018-06-18 NOTE — PROVIDER NOTIFICATION
Notified Person Name: Soumya Bipin LEDEZMA    Notification Date/Time: 1015 June 18, 2018    Notification Interaction: Spoke via telephone    Purpose of Notification:  Pt's oxygen levels on room air down as low at 78%.    Orders Received:  D-dimer, CXR

## 2018-06-18 NOTE — PLAN OF CARE
Problem: Patient Care Overview  Goal: Plan of Care/Patient Progress Review  Outcome: No Change  Pt is A&Ox4. Up with SBA, walked x3- needs encouragement. Weaned to 1L NC, VSS. R GABI draining serosanguineous output. Midline incision dressing CDI. Abdominal binder in place. C/o abdominal pain, gave oxy x2 with relief. Now on scheduled Tylenol. C/o nausea, gave Zofran x1 with relief. BS low pitched, active in upper quadrants. Advanced to low fiber diet, tolerating well. Using IS at bedside. DC date pending.

## 2018-06-19 VITALS
SYSTOLIC BLOOD PRESSURE: 107 MMHG | DIASTOLIC BLOOD PRESSURE: 67 MMHG | OXYGEN SATURATION: 94 % | HEIGHT: 63 IN | HEART RATE: 87 BPM | WEIGHT: 236.2 LBS | TEMPERATURE: 96.7 F | BODY MASS INDEX: 41.85 KG/M2 | RESPIRATION RATE: 16 BRPM

## 2018-06-19 LAB
BUN SERPL-MCNC: 15 MG/DL (ref 7–30)
CALCIUM SERPL-MCNC: 8.6 MG/DL (ref 8.5–10.1)
CHLORIDE SERPL-SCNC: 103 MMOL/L (ref 94–109)
CO2 SERPL-SCNC: 25 MMOL/L (ref 20–32)
CREAT SERPL-MCNC: 0.82 MG/DL (ref 0.52–1.04)
ERYTHROCYTE [DISTWIDTH] IN BLOOD BY AUTOMATED COUNT: 14.2 % (ref 10–15)
GFR SERPL CREATININE-BSD FRML MDRD: 73 ML/MIN/1.7M2
GLUCOSE SERPL-MCNC: 117 MG/DL (ref 70–99)
HCT VFR BLD AUTO: 34.9 % (ref 35–47)
HGB BLD-MCNC: 11.2 G/DL (ref 11.7–15.7)
MAGNESIUM SERPL-MCNC: 1.7 MG/DL (ref 1.6–2.3)
MCH RBC QN AUTO: 27.5 PG (ref 26.5–33)
MCHC RBC AUTO-ENTMCNC: 32.1 G/DL (ref 31.5–36.5)
MCV RBC AUTO: 86 FL (ref 78–100)
NT-PROBNP SERPL-MCNC: 103 PG/ML (ref 0–900)
PHOSPHATE SERPL-MCNC: 3.7 MG/DL (ref 2.5–4.5)
PLATELET # BLD AUTO: 269 10E9/L (ref 150–450)
POTASSIUM SERPL-SCNC: 3.9 MMOL/L (ref 3.4–5.3)
PROCALCITONIN SERPL-MCNC: 0.05 NG/ML
RBC # BLD AUTO: 4.07 10E12/L (ref 3.8–5.2)
SODIUM SERPL-SCNC: 139 MMOL/L (ref 133–144)
WBC # BLD AUTO: 8.1 10E9/L (ref 4–11)

## 2018-06-19 PROCEDURE — 99207 ZZC CONSULT E&M CHANGED TO INITIAL LEVEL: CPT | Performed by: NURSE PRACTITIONER

## 2018-06-19 PROCEDURE — 83735 ASSAY OF MAGNESIUM: CPT | Performed by: NURSE PRACTITIONER

## 2018-06-19 PROCEDURE — 40000275 ZZH STATISTIC RCP TIME EA 10 MIN

## 2018-06-19 PROCEDURE — 80048 BASIC METABOLIC PNL TOTAL CA: CPT | Performed by: PHYSICIAN ASSISTANT

## 2018-06-19 PROCEDURE — 85027 COMPLETE CBC AUTOMATED: CPT | Performed by: PHYSICIAN ASSISTANT

## 2018-06-19 PROCEDURE — 36415 COLL VENOUS BLD VENIPUNCTURE: CPT | Performed by: NURSE PRACTITIONER

## 2018-06-19 PROCEDURE — 84100 ASSAY OF PHOSPHORUS: CPT | Performed by: PHYSICIAN ASSISTANT

## 2018-06-19 PROCEDURE — 94640 AIRWAY INHALATION TREATMENT: CPT

## 2018-06-19 PROCEDURE — 25000128 H RX IP 250 OP 636: Performed by: PHYSICIAN ASSISTANT

## 2018-06-19 PROCEDURE — 99221 1ST HOSP IP/OBS SF/LOW 40: CPT | Performed by: NURSE PRACTITIONER

## 2018-06-19 PROCEDURE — 84100 ASSAY OF PHOSPHORUS: CPT | Performed by: NURSE PRACTITIONER

## 2018-06-19 PROCEDURE — 84145 PROCALCITONIN (PCT): CPT | Performed by: NURSE PRACTITIONER

## 2018-06-19 PROCEDURE — 25000125 ZZHC RX 250: Performed by: PHYSICIAN ASSISTANT

## 2018-06-19 PROCEDURE — 25000132 ZZH RX MED GY IP 250 OP 250 PS 637: Performed by: PHYSICIAN ASSISTANT

## 2018-06-19 PROCEDURE — 83735 ASSAY OF MAGNESIUM: CPT | Performed by: PHYSICIAN ASSISTANT

## 2018-06-19 PROCEDURE — 36415 COLL VENOUS BLD VENIPUNCTURE: CPT | Performed by: PHYSICIAN ASSISTANT

## 2018-06-19 PROCEDURE — 93010 ELECTROCARDIOGRAM REPORT: CPT | Performed by: INTERNAL MEDICINE

## 2018-06-19 PROCEDURE — 83880 ASSAY OF NATRIURETIC PEPTIDE: CPT | Performed by: PHYSICIAN ASSISTANT

## 2018-06-19 PROCEDURE — 93005 ELECTROCARDIOGRAM TRACING: CPT

## 2018-06-19 RX ORDER — AMOXICILLIN 250 MG
1-2 CAPSULE ORAL 2 TIMES DAILY PRN
Status: DISCONTINUED | OUTPATIENT
Start: 2018-06-19 | End: 2018-06-19 | Stop reason: HOSPADM

## 2018-06-19 RX ORDER — POLYETHYLENE GLYCOL 3350 17 G/17G
17 POWDER, FOR SOLUTION ORAL DAILY PRN
Status: DISCONTINUED | OUTPATIENT
Start: 2018-06-19 | End: 2018-06-19 | Stop reason: HOSPADM

## 2018-06-19 RX ADMIN — ENOXAPARIN SODIUM 40 MG: 40 INJECTION SUBCUTANEOUS at 00:18

## 2018-06-19 RX ADMIN — ALBUTEROL SULFATE 2.5 MG: 2.5 SOLUTION RESPIRATORY (INHALATION) at 07:29

## 2018-06-19 RX ADMIN — ENOXAPARIN SODIUM 40 MG: 40 INJECTION SUBCUTANEOUS at 14:17

## 2018-06-19 RX ADMIN — IBUPROFEN 600 MG: 600 TABLET ORAL at 14:16

## 2018-06-19 RX ADMIN — IBUPROFEN 600 MG: 600 TABLET ORAL at 18:27

## 2018-06-19 RX ADMIN — ACETAMINOPHEN 650 MG: 325 TABLET, FILM COATED ORAL at 14:16

## 2018-06-19 RX ADMIN — BUPROPION HYDROCHLORIDE 450 MG: 150 TABLET, FILM COATED, EXTENDED RELEASE ORAL at 08:02

## 2018-06-19 RX ADMIN — LETROZOLE 2.5 MG: 2.5 TABLET, FILM COATED ORAL at 08:02

## 2018-06-19 RX ADMIN — ACETAMINOPHEN 650 MG: 325 TABLET, FILM COATED ORAL at 08:02

## 2018-06-19 RX ADMIN — FAMOTIDINE 20 MG: 10 INJECTION, SOLUTION INTRAVENOUS at 08:02

## 2018-06-19 RX ADMIN — IBUPROFEN 600 MG: 600 TABLET ORAL at 08:02

## 2018-06-19 NOTE — PROGRESS NOTES
hospitalist follow up note    ekg WNL, NSR, normal intervals, no ischemic changes  proBNP low  procalcitonin neg  No significant electrolyte deficits  Did well on ambulation w/o supplemental O2  Continue to recommend outpt sleep study.   May be be dismissed home from hospitalist perspective.     [place] CNP  Hospitalist house officer  [phone]

## 2018-06-19 NOTE — PROGRESS NOTES
Surgery  Patient doing well, pain controlled with oral agents.  Oxygen saturations have steadily improved throughout the day and she is currently doing well on room air.  I think she could discharge today unless medicine has further interventions or evaluation.    Jamie Lopez MD  General Surgery, Office [phone]

## 2018-06-19 NOTE — PLAN OF CARE
Problem: Patient Care Overview  Goal: Plan of Care/Patient Progress Review  POD4.  BP soft, other VSS.  Desatted into high 80's while sleeping, placed on 2L NC.  No PRN pain meds needed overnight.  Midline dressing CDI.  Abdominal binder in place.  Minimal serosanguineous output from GABI.  Possible d/c home today.

## 2018-06-19 NOTE — CONSULTS
Consult Date:  06/19/2018      HOSPITALIST CONSULTATION       REASON FOR CONSULTATION:  Hypoxia unable to wean off of oxygen.      REQUESTING PHYSICIAN:  Soumya Voss PA-C      HISTORY OF PRESENT ILLNESS:  Ms. Hood is a 53-year-old woman with past history of ovarian cancer, status post debulking surgery with exploratory laparotomy, bilateral pelvic and periaortic lymph node dissection and trachelectomy in 2014, felt to be curative in nature.  She had a CT scan in 09/2017 as surveillance followup but did not find out results until 03/2018 that she had a hernia.  She had been having some bowel habit changes since her debulking surgery.  She was referred to a surgeon for evaluation who recommended operative intervention lest the hernia increased in size.  On 06/15/2018, with Dr. Lopez, patient underwent open ventral hernia repair with mesh placement.  Since her surgery, she has required anywhere from 8L on initial postop day zero, down to 1-3 liters of O2 supplementation.  She is a former smoker, at least 20 pack-year history, quitting in 2006.  She is not formally diagnosed with sleep apnea, but as per her  she snores and may have apneic episodes.  There is no personal or family history of VTE.  She has been prescribed incentive spirometry which she has been using minimally particularly in the last 24 hours and last used it last night, none today.  She denied any shortness of breath until she moved to a chair and then she felt slightly winded.  She endorses a mild cough and some mucus production but swallows it.  There has been no wheezing, but she does note that it is hard to take a deep breath in, though she denies any pleuritic type pain.  There is no hemoptysis.  As per nursing staff, she requires 2 liters of O2 with exertion.  She had a mild fever of 99.7 at 7:42 a.m. on 06/18/2018.  Also on 06/18/2015, patient had an episode of hypoxia with saturations as well as low as 78% on room air.  D-dimer was  obtained which was elevated, potentially related to her recent surgery.  She had a chest x-ray which showed bibasilar atelectasis and trace right pleural effusion, no pneumothorax.  On that same day, she underwent CT imaging of the chest with IV contrast.  There was no pulmonary embolism, moderate bilateral lower lobe infiltrates, and a patchy right upper lobe infiltrate.  There is trace bilateral pleural fluid bilaterally and a somewhat elevated right hemidiaphragm.  There is no pleural or pericardial effusion, no pneumothorax.  The patient denies any chest pain, palpitations.  She is not known to have any arrhythmias.  She denies any chills, recent hospitalizations, rehab stays or antibiotic use.  Because of her ongoing need for O2 requirement and anticipated discharge in the near future, the Hospitalist Service was asked to see her in consultation for ongoing hypoxia.      PAST MEDICAL HISTORY:   1.  Ovarian cancer diagnosed in , status post debulking surgery, now in remission.   2.  Depression.   3.  Eczema.   4.  Pelvic adhesive disease.      PAST SURGICAL HISTORY:     1.  Status post above surgery.   2.  Status post open ventral hernia repair with mesh on 06/15/2018.      ALLERGIES:  BACITRACIN AND TAPE.      SOCIAL HISTORY:  A nondrug user.  Rare alcohol use.  , no kids.  Former 20 pack-year smoker.  Works as an  in the [place], [place] area.      FAMILY MEDICAL HISTORY:  Paternal grandmother with breast cancer.  Father  at age 66 of prostate cancer, heart disease, and diabetes.  Maternal grandmother  of pancreatic cancer.  Mother  at age 73 of complications of RA.      OUTPATIENT MEDICATIONS:   1.  Bupropion 450 mg p.o. daily.   2.  Glucosamine chondroitin 2 tabs p.o. daily.   3.  Ibuprofen 400-600 mg p.o. b.i.d. p.r.n. for pain.   4.  Letrozole 2.5 mg p.o. daily.   5.  Multivitamin.   6.  Nystatin ointment topical 2 times a day as needed.      REVIEW OF  SYSTEMS:   CONSTITUTIONAL:  Negative for fevers, chills, recent hospitalizations, rehab stays or antibiotic use.   MUSCULOSKELETAL:  The patient has been ambulatory but not yet today, though her goal is 4 times a day.  I saw her at approximately noon.     PULMONARY:  As per HPI.    GI:  The patient is moving bowels, not really passing flatus.  She has had anorexia.  There have been no choking episodes.  No hematochezia.   GENITOURINARY:  She denies any dysuria.   CARDIOVASCULAR:  As per HPI.   NEUROLOGIC:  Increased sleep requirements today, but no syncopal episodes, mild headache, no vision changes.   INTEGUMENT:  She denies any acute skin changes.   ENDOCRINE:  She denies any personal history of diabetes but does have some narrow temperature ranges when she is comfortable.      PHYSICAL EXAMINATION:     CNS:  RASS 0.  Follows commands to show 2 fingers and both feet bilaterally.  Speech is fluent.  She is able to transfer from bed to chair without assistance.   CARDIOVASCULAR:  S1, S2, no murmurs.   LUNGS:  Clear to auscultation in bilateral upper lobes.  Respiratory rate 16, SpO2 on room air at rest ranged from 90%-96%.  There is no wheezing or rhonchi or rales.  There is decreased air entry in the right lower lobe.   ABDOMEN:  Hypoactive bowel sounds, soft, nontender, nondistended.  Infraumbilical hernia with clean, dry, intact dressing and abdominal binder in place.   EXTREMITIES:  Without edema.      IMPRESSION:  Hospital day 5, postop day #4 status post open ventral hernia repair with mesh replacement for symptomatic ventral hernia.  This has been complicated by mild hypoxia requiring O2 supplementation for which Hospitalist Service was asked to see this patient in consultation.      PROBLEM LIST AND PLAN:      Mild hypoxia with O2 requirements ranging from 1-3 liters in the last 24 hours.  PE has been ruled out by neg CTA chest but showing atelectasis in bilateral lower lobe and patchy right upper lobe  infiltrates.  Last low-grade fever was on 06/18/2018.  She does not have leukocytosis to suggest HCAP.  There is no chest pain or other anginal equivalent symptoms to suggest acute coronary syndrome.  She has no personal history of heart failure or valvular disease or arrhythmias.  I think at this point, differential includes possible HCAP though we have not been able to definitively rule that out yet, atelectasis, possible undiagnosed sleep apnea, possible undiagnosed COPD, possible electrolyte deficiency contributing to weakness.   -- we will obtain electrocardiogram to r/o ACS & arrhythmia.   -- Will add on BMP, magnesium, phosphorus and proBNP.   -- Will ask RT to continue to work with her for incentive spirometry.  An extensive teaching has been done on this regarding frequency of use for both IS and Acapella.   -- Unless her proBNP is quite elevated, we will hold off on diuresis as her p.o. intake is poor at present and she is only +929 mL since admission.   -- Will obtain walking O2 saturations.     -- In light of her recent fever but lack of leukocytosis, we will check procalcitonin.   -- We will hold off on any antimicrobials at present without definitive evidence of pneumonia.   -- The patient is counseled on need to undergo outpatient polysomnography to either rule in or rule out obstructive sleep apnea upon full recovery from her recent surgery.   -- In the event that her EKG is within normal limits as are her electrolytes, proBNP and procalcitonin, she potentially could be discharged as soon as today with home O2.   -- I did give her a trial off of oxygen and left her off for the majority of the interview.  At no point did she ever drop below 90%.      Remainder of care plan as per primary surgical service.    We thank you for this interesting consult.  The Hospitalist Service will continue to follow her throughout the duration of the hospitalization.      Total time is 40 minutes from 11:20 to 11:40,  11:41 to 12:11 p.m. which 28 minutes was face-to-face time, remainder spent in the counseling and coordination of care.         TRELL CORTES MD       As dictated by JANELLE WHITEHEAD, APRN, CNP            D: 2018   T: 2018   MT: CC      Name:     DEYSI OWEN   MRN:      [id number]        Account:       [id number]   :      1965           Consult Date:  2018      Document: [id number]       cc: Soumya Vasquez MD

## 2018-06-19 NOTE — PROVIDER NOTIFICATION
MD Notification    Notified Person: MD sibley    Notified Person Name:    Notification Date/Time: 6/19/2018 5:39 PM      Notification Interaction:    Purpose of Notification: Pt with abd pain, no iv access, and concern re phenergan dose    Orders Received:    Comments: No new orders for now, try calling GI. Reassure patient. Try po zofran

## 2018-06-19 NOTE — PLAN OF CARE
Problem: Patient Care Overview  Goal: Plan of Care/Patient Progress Review  Outcome: Therapy, progress toward functional goals as expected  A/Ox4, VSS on 0.5L at rest, 2L NCO2 with exertion at times this morning.  Improved to 94% on r/a after IS and acapela use.  See walk test note.  LS clear, IS/acapela encouraged.  Dressing removed/replaced by surgery.  Scheduled tylenol/ibuprofen for pain with good result.  Possible d/c today.  Up independently.

## 2018-06-19 NOTE — PROGRESS NOTES
"General Surgery Progress Note    Admission Date: 6/15/2018  Today's Date: 6/19/2018         Assessment:      Penny Hood is a 53 year old female   S/P open ventral incisional hernia repair with mesh  POD #4  Diagnosis: large ventral hernia         Plan:   - Consult hospitalist to evaluate hypoxia and make recommendations for discharge as indicated  - Continue with ambulation, pulmonary toilet  - Will add Boost shakes between meals  - Hold bowel regimen, having loose stools now  - Oxy, tylenol, ibuprofen for pain  - PCDs, lovenox, zantac  - Remove drain prior to discharge    Dispo: hopefully home later today vs tomorrow pending hospitalist input regarding hypoxia        Interval History:   Afebrile, O2 requirement slightly improving. Patient continues to be free of chest pain, shortness of breath, dizziness. Ambulating and voiding adequately. Having loose BMs, bowel regimen held. No nausea. Tolerating diet but appetite is low. Pain controlled with current regimen. White count normal.          Physical Exam:   /62 (BP Location: Left arm)  Pulse 87  Temp 96  F (35.6  C) (Oral)  Resp 16  Ht 1.6 m (5' 3\")  Wt 107.1 kg (236 lb 3.2 oz)  SpO2 90%  BMI 41.84 kg/m2  I/O last 3 completed shifts:  In: 420 [P.O.:420]  Out: 590 [Urine:550; Drains:40]  General: NAD, pleasant, alert and oriented x3  Cardiovascular: regular rate and rhythm; S1 and S2 distinct without murmur  Respiratory: lung sounds distant but clear to anterior auscultation with no rales or wheezes heard  Abdomen: soft, appropriately tender, non distended, + bowel sounds; drain in place with scant SS fluid in bulb  Incisions: clean, dry, and intact with steris in place  Extremities: no edema, no calf tenderness    LABS:  Recent Labs   Lab Test  06/19/18 0655 06/16/18 0618   WBC  8.1   --    HGB  11.2*  11.2*   MCV  86   --    PLT  269  242      Recent Labs   Lab Test  06/19/18 0655 06/16/18 0618   POTASSIUM   --   4.1   CHLORIDE   --   " 104   CO2   --   27   BUN   --   13   CR  0.82  0.85   ANIONGAP   --   7              Soumya Voss PA-C  [facility]: [phone]  Pager: [phone]@[place] (7am-4pm)

## 2018-06-19 NOTE — PROGRESS NOTES
Patient has been assessed for Home Oxygen needs. Oxygen readings:    *Pulse oximetry (SpO2) = 96 % on room air at rest while awake.    *SpO2 = 94 % on room air during activity/with exercise.    No oxygen application was needed.  Patient was 92% on r/a at rest, prior to ambulation and 96% on r/a, at rest,  upon completion.      Patient walked around unit, with writer, and returned to room.

## 2018-06-19 NOTE — PLAN OF CARE
Problem: Patient Care Overview  Goal: Plan of Care/Patient Progress Review  Outcome: No Change  Pt A&Ox4, up with SBA in hallway. Walked 2x. R PIV SL'd. Midline incision covered, dressing CDI. Abdominal binder in place. R-GABI drain dressing CDI, adequate drainage. No PRN pain meds needed on shift, on scheduled Tylenol and Ibuprofen. Fine crackles in BLL. IS at bedside. Ate 50% dinner. 1 BM on shift, loose 400cc. DC date possibly tomorrow.

## 2018-06-20 NOTE — PROGRESS NOTES
Patient discharged to home with  via wheelchair and private ride.  No pain, antonette sm amt of po without n/v.  GABI pulled. AVS reviewed.  Meds filled. Sats in 90's on RA. IS and acapella sent home.

## 2018-06-20 NOTE — DISCHARGE SUMMARY
Discharge Summary    Penny Hood MRN# [id number]   YOB: 1965 Age: 53 year old     Date of Admission:  6/15/2018  Date of Discharge:  6/19/2018  7:23 PM  Admitting Physician:  Toni Lopez MD  Discharging Service:  Count includes the [facility] General Surgery  Primary Provider: Jodie Vasquez          Discharge Diagnoses:   Principle Diagnosis:  VENTRAL HERNIA    Secondary Diagnosis:  Mild hypoxia         Brief HPI   Patient underwent open total abdominal hysterectomy bilateral salpingo-oophorectomy many years ago.  She began having bulging along her lower midline incision and CT scan confirmed a large midline ventral hernia.  She now presents for open ventral hernia repair with mesh.  Risks and benefits were explained.           Hospital Course   Penny oHod underwent open ventral hernia repair with mesh by Dr. Lopez on 6/15/18. Postoperatively she was admitted to the hospital for recovery and monitoring. On POD 0 she was initiated on a clear liquid diet which she tolerated well. Her diet was slowly advanced as tolerated. During her hospital stay she did have some prolonged mild hypoxia prompting workup for PE which was negative. The hospitalist was consulted to assess further. Please see their complete consultation note for full details. The patient ultimately was found to have no abnormal findings to indicate reason for hypoxia. She was able to ambulate without supplemental oxygen on the day of discharge and did not require additional oxygen at discharge. On the day of discharge she had stable vital signs, was afebrile, was tolerating oral intake, had adequate pain control on PO meds, was having bowel movements and was therefore appropriate for discharge to home. It was recommended by the hospitalist that she have an outpatient sleep study performed. All discharge instructions were reviewed with the patient and questions were answered. She was asked to follow-up with Dr. Lopez in 1-2 weeks for  "recheck.          Consultations:   Hospitalist         Procedures / Labs / Imaging:   Please see chart review for complete labs/imaging         Discharge Disposition:   Discharged to home          Condition on Discharge:   Discharge condition: Stable   Discharge vitals: Blood pressure 107/67, pulse 87, temperature 96.7  F (35.9  C), temperature source Oral, resp. rate 16, height 1.6 m (5' 3\"), weight 107.1 kg (236 lb 3.2 oz), SpO2 94 %.          Discharge Medications:     Discharge Medication List as of 6/19/2018  6:45 PM      START taking these medications    Details   acetaminophen (TYLENOL) 325 MG tablet Take 2 tablets (650 mg) by mouth every 6 hours, Disp-100 tablet, R-0, OTC      !! ibuprofen (ADVIL/MOTRIN) 600 MG tablet Take 1 tablet (600 mg) by mouth 3 times daily (with meals), Disp-120 tablet, R-0, OTC      ondansetron (ZOFRAN-ODT) 4 MG ODT tab Take 1 tablet (4 mg) by mouth every 6 hours as needed for nausea or vomiting, Disp-120 tablet, R-0, E-Prescribe      oxyCODONE IR (ROXICODONE) 5 MG tablet Take 1-2 tablets (5-10 mg) by mouth every 3 hours as needed for other (pain control or improvement in physical function. Hold dose for analgesic side effects.), Disp-30 tablet, R-0, Local Print      senna-docusate (SENOKOT-S;PERICOLACE) 8.6-50 MG per tablet Take 1-2 tablets by mouth 2 times daily as needed for constipation, Disp-60 tablet, R-0, E-Prescribe       !! - Potential duplicate medications found. Please discuss with provider.      CONTINUE these medications which have NOT CHANGED    Details   BuPROPion HCl (WELLBUTRIN XL PO) Take 450 mg by mouth daily (1 x 150 mg + 1 x 300 mg = 450 mg dose), Historical      glucosamine-chondroitinoitin 750-600 MG TABS Take 2 tablets by mouth daily, Historical      !! IBUPROFEN PO Take 400-600 mg by mouth 2 times daily as needed for moderate pain , Historical      LETROZOLE PO Take 2.5 mg by mouth daily , Historical      Multiple Vitamins-Minerals (HAIR SKIN AND NAILS " FORMULA PO) Take 2 tablets by mouth daily, Historical      multivitamin, therapeutic (THERA-VIT) TABS tablet Take 1 tablet by mouth daily, Historical      nystatin (MYCOSTATIN) ointment Apply topically 2 times daily as needed Historical       !! - Potential duplicate medications found. Please discuss with provider.               Discharge Instructions and Follow-Up:     See AVS for complete discharge instructions. Pt has appointment to see Dr. Lopez for follow-up on 6/28/18.        Soumya Voss PA-C  [facility]  [phone]

## 2018-06-22 LAB — INTERPRETATION ECG - MUSE: NORMAL

## 2018-06-26 ENCOUNTER — OFFICE VISIT (OUTPATIENT)
Dept: SURGERY | Facility: CLINIC | Age: 53
End: 2018-06-26
Payer: COMMERCIAL

## 2018-06-26 DIAGNOSIS — Z09 SURGERY FOLLOW-UP EXAMINATION: Primary | ICD-10-CM

## 2018-06-26 PROCEDURE — 99024 POSTOP FOLLOW-UP VISIT: CPT | Performed by: SURGERY

## 2018-06-26 NOTE — LETTER
[facility]  [address], [address]  [facility] [address]  [phone]          June 26, 2018    RE:  Penny Hood                                                                                                                                                       [address] [place] [address]            To whom it may concern:    Penny Hood is under my professional care for surgery. She  may return to work with the following: The employee is UNABLE to return to work until 7/9/18    When the patient returns to work, the following restrictions apply until 8/6/18:    No lifting 20 pounds or more     Sincerely,        Toni Lopez MD/[place] MA

## 2018-06-26 NOTE — LETTER
2018    RE: Penny Hood, : 1965      Surgery Postop Note     Penny Hood presents today for surgical followup.  she is doing well following open ventral hernia repair.  Incisions look fine with no signs of wound infection.  We talked about her return to work and activity level.  I expect her to make a complete recovery.  Thank you for the opportunity to help in her care.     Jamie Lopez M.D.  [facility]  [phone]

## 2018-06-26 NOTE — MR AVS SNAPSHOT
"              After Visit Summary   2018    Penny Hood    MRN: [id number]           Patient Information     Date Of Birth          1965        Visit Information        Provider Department      2018 10:15 AM Toni Lopez MD [facility] General Surgery      Today's Diagnoses     Surgery follow-up examination    -  1       Follow-ups after your visit        Who to contact     If you have questions or need follow up information about today's clinic visit or your schedule please contact [facility] directly at [phone].  Normal or non-critical lab and imaging results will be communicated to you by mParticlehart, letter or phone within 4 business days after the clinic has received the results. If you do not hear from us within 7 days, please contact the clinic through MySupportAssistantt or phone. If you have a critical or abnormal lab result, we will notify you by phone as soon as possible.  Submit refill requests through Sviral or call your pharmacy and they will forward the refill request to us. Please allow 3 business days for your refill to be completed.          Additional Information About Your Visit        MyChart Information     Sviral lets you send messages to your doctor, view your test results, renew your prescriptions, schedule appointments and more. To sign up, go to [email or web address] . Click on \"Log in\" on the left side of the screen, which will take you to the Welcome page. Then click on \"Sign up Now\" on the right side of the page.     You will be asked to enter the access code listed below, as well as some personal information. Please follow the directions to create your username and password.     Your access code is: [email or web address]  Expires: 2018  1:30 PM     Your access code will  in 90 days. If you need help or a new code, please call your [facility] clinic or [phone].        Care EveryWhere ID     This " is your Care EveryWhere ID. This could be used by other organizations to access your [facility] medical records  [id number]         Blood Pressure from Last 3 Encounters:   06/19/18 107/67   04/12/18 100/60    Weight from Last 3 Encounters:   06/15/18 236 lb 3.2 oz (107.1 kg)   04/12/18 235 lb (106.6 kg)              Today, you had the following     No orders found for display       Primary Care Provider Office Phone # Fax #    Jodie Vasquez [phone]       [facility] [address] [place] [address]        Equal Access to Services     [facility]: Hadii aad carleen hadasho Sorubia, waaxda luqadaha, qaybta kaalmada adericardoyada, boom overton . So [facility] [phone].    ATENCIÓN: Si habla español, tiene a sethi disposición servicios gratuitos de asistencia lingüística. [facility] [phone].    We comply with applicable federal civil rights laws and [place] laws. We do not discriminate on the basis of race, color, national origin, age, disability, sex, sexual orientation, or gender identity.            Thank you!     Thank you for choosing [facility]  for your care. Our goal is always to provide you with excellent care. Hearing back from our patients is one way we can continue to improve our services. Please take a few minutes to complete the written survey that you may receive in the mail after your visit with us. Thank you!             Your Updated Medication List - Protect others around you: Learn how to safely use, store and throw away your medicines at www.disposemymeds.org.          This list is accurate as of 6/26/18 10:51 AM.  Always use your most recent med list.                   Brand Name Dispense Instructions for use Diagnosis    acetaminophen 325 MG tablet    TYLENOL    100 tablet    Take 2 tablets (650 mg) by mouth every 6 hours    Ventral hernia without obstruction or gangrene       glucosamine-chondroitinoitin 750-600 MG Tabs      Take  2 tablets by mouth daily        HAIR SKIN AND NAILS FORMULA PO      Take 2 tablets by mouth daily        * IBUPROFEN PO      Take 400-600 mg by mouth 2 times daily as needed for moderate pain        * ibuprofen 600 MG tablet    ADVIL/MOTRIN    120 tablet    Take 1 tablet (600 mg) by mouth 3 times daily (with meals)    Ventral hernia without obstruction or gangrene       LETROZOLE PO      Take 2.5 mg by mouth daily        multivitamin, therapeutic Tabs tablet      Take 1 tablet by mouth daily        nystatin ointment    MYCOSTATIN     Apply topically 2 times daily as needed        ondansetron 4 MG ODT tab    ZOFRAN-ODT    120 tablet    Take 1 tablet (4 mg) by mouth every 6 hours as needed for nausea or vomiting    Ventral hernia without obstruction or gangrene       oxyCODONE IR 5 MG tablet    ROXICODONE    30 tablet    Take 1-2 tablets (5-10 mg) by mouth every 3 hours as needed for other (pain control or improvement in physical function. Hold dose for analgesic side effects.)    Ventral hernia without obstruction or gangrene       senna-docusate 8.6-50 MG per tablet    SENOKOT-S;PERICOLACE    60 tablet    Take 1-2 tablets by mouth 2 times daily as needed for constipation    Ventral hernia without obstruction or gangrene       WELLBUTRIN XL PO      Take 450 mg by mouth daily (1 x 150 mg + 1 x 300 mg = 450 mg dose)        * Notice:  This list has 2 medication(s) that are the same as other medications prescribed for you. Read the directions carefully, and ask your doctor or other care provider to review them with you.

## 2018-06-27 NOTE — PROGRESS NOTES
Surgery Postop Note    Penny Hood presents today for surgical followup.  she is doing well following open ventral hernia repair.  Incisions look fine with no signs of wound infection.  We talked about her return to work and activity level.  I expect her to make a complete recovery.  Thank you for the opportunity to help in her care.    Jamie Lopez M.D.  [facility]  [phone]    Please route or send letter to:  Primary Care Provider (PCP) and Referring Provider

## (undated) DEVICE — GLOVE ESTEEM POWDER FREE SMT 6.5  2D72PT65

## (undated) DEVICE — DRSG STERI STRIP 1/2X4" R1547

## (undated) DEVICE — SUCTION TIP YANKAUER STR K87

## (undated) DEVICE — GOWN LG DISP 9515

## (undated) DEVICE — SU SILK 2-0 FS-1 18" 685G

## (undated) DEVICE — DRSG GAUZE 4X4" 3033

## (undated) DEVICE — SUCTION CANISTER MEDIVAC LINER 3000ML W/LID 65651-530

## (undated) DEVICE — SU VICRYL 3-0 CT-1 36" J338H

## (undated) DEVICE — SU VICRYL 1 CTX CR 8X18" J765D

## (undated) DEVICE — DRAPE IOBAN INCISE 23X17" 6650EZ

## (undated) DEVICE — GLOVE PROTEXIS W/NEU-THERA 7.5  2D73TE75

## (undated) DEVICE — GLOVE PROTEXIS MICRO 6.5  2D73PM65

## (undated) DEVICE — GLOVE PROTEXIS BLUE W/NEU-THERA 7.5  2D73EB75

## (undated) DEVICE — DRAPE LAP W/ARMBOARD 29410

## (undated) DEVICE — DRAIN JACKSON PRATT 15FR ROUND SIL LF JP-2229

## (undated) DEVICE — SU VICRYL 2-0 TIE 12X18" J905T

## (undated) DEVICE — DRAIN JACKSON PRATT RESERVOIR 100ML SU130-1305

## (undated) DEVICE — SU VICRYL 0 CT-1 CR 8X18" J740D

## (undated) DEVICE — SU VICRYL 4-0 PS-2 18" UND J496H

## (undated) DEVICE — SOL WATER IRRIG 1000ML BOTTLE 2F7114

## (undated) DEVICE — PREP CHLORAPREP 26ML TINTED ORANGE  260815

## (undated) DEVICE — LINEN TOWEL PACK X5 5464

## (undated) DEVICE — Device

## (undated) DEVICE — GLOVE PROTEXIS BLUE W/NEU-THERA 7.0  2D73EB70

## (undated) DEVICE — SPONGE LAP 18X18" X8435

## (undated) DEVICE — SU VICRYL 2-0 CT-1 27" UND J259H

## (undated) DEVICE — BNDG ABDOMINAL BINDER 9X62-84" 79-89210

## (undated) DEVICE — ESU ELEC BLADE 2.75" COATED/INSULATED E1455

## (undated) DEVICE — PACK MINOR SBA15MIFSE

## (undated) RX ORDER — FENTANYL CITRATE 50 UG/ML
INJECTION, SOLUTION INTRAMUSCULAR; INTRAVENOUS
Status: DISPENSED
Start: 2018-06-15

## (undated) RX ORDER — HYDROMORPHONE HYDROCHLORIDE 1 MG/ML
INJECTION, SOLUTION INTRAMUSCULAR; INTRAVENOUS; SUBCUTANEOUS
Status: DISPENSED
Start: 2018-06-15

## (undated) RX ORDER — PROPOFOL 10 MG/ML
INJECTION, EMULSION INTRAVENOUS
Status: DISPENSED
Start: 2018-06-15

## (undated) RX ORDER — ONDANSETRON 2 MG/ML
INJECTION INTRAMUSCULAR; INTRAVENOUS
Status: DISPENSED
Start: 2018-06-15

## (undated) RX ORDER — DEXAMETHASONE SODIUM PHOSPHATE 4 MG/ML
INJECTION, SOLUTION INTRA-ARTICULAR; INTRALESIONAL; INTRAMUSCULAR; INTRAVENOUS; SOFT TISSUE
Status: DISPENSED
Start: 2018-06-15

## (undated) RX ORDER — CEFAZOLIN SODIUM 2 G/100ML
INJECTION, SOLUTION INTRAVENOUS
Status: DISPENSED
Start: 2018-06-15

## (undated) RX ORDER — NEOSTIGMINE METHYLSULFATE 1 MG/ML
VIAL (ML) INJECTION
Status: DISPENSED
Start: 2018-06-15

## (undated) RX ORDER — GLYCOPYRROLATE 0.2 MG/ML
INJECTION, SOLUTION INTRAMUSCULAR; INTRAVENOUS
Status: DISPENSED
Start: 2018-06-15

## (undated) RX ORDER — KETOROLAC TROMETHAMINE 15 MG/ML
INJECTION, SOLUTION INTRAMUSCULAR; INTRAVENOUS
Status: DISPENSED
Start: 2018-06-15